# Patient Record
Sex: FEMALE | Race: WHITE | NOT HISPANIC OR LATINO | Employment: FULL TIME | URBAN - METROPOLITAN AREA
[De-identification: names, ages, dates, MRNs, and addresses within clinical notes are randomized per-mention and may not be internally consistent; named-entity substitution may affect disease eponyms.]

---

## 2017-02-27 ENCOUNTER — ALLSCRIPTS OFFICE VISIT (OUTPATIENT)
Dept: OTHER | Facility: OTHER | Age: 62
End: 2017-02-27

## 2017-03-07 ENCOUNTER — ALLSCRIPTS OFFICE VISIT (OUTPATIENT)
Dept: OTHER | Facility: OTHER | Age: 62
End: 2017-03-07

## 2017-04-06 DIAGNOSIS — Z47.1 AFTERCARE FOLLOWING JOINT REPLACEMENT SURGERY: ICD-10-CM

## 2017-04-06 DIAGNOSIS — M16.11 PRIMARY OSTEOARTHRITIS OF RIGHT HIP: ICD-10-CM

## 2017-04-07 ENCOUNTER — HOSPITAL ENCOUNTER (OUTPATIENT)
Dept: RADIOLOGY | Facility: MEDICAL CENTER | Age: 62
Discharge: HOME | End: 2017-04-07
Payer: COMMERCIAL

## 2017-04-07 ENCOUNTER — ALLSCRIPTS OFFICE VISIT (OUTPATIENT)
Dept: OTHER | Facility: OTHER | Age: 62
End: 2017-04-07

## 2017-04-07 DIAGNOSIS — Z47.1 AFTERCARE FOLLOWING JOINT REPLACEMENT SURGERY: ICD-10-CM

## 2017-04-07 DIAGNOSIS — M16.11 PRIMARY OSTEOARTHRITIS OF RIGHT HIP: ICD-10-CM

## 2017-04-07 PROCEDURE — 73502 X-RAY EXAM HIP UNI 2-3 VIEWS: CPT

## 2017-07-31 ENCOUNTER — ALLSCRIPTS OFFICE VISIT (OUTPATIENT)
Dept: OTHER | Facility: OTHER | Age: 62
End: 2017-07-31

## 2017-08-01 ENCOUNTER — HOSPITAL ENCOUNTER (EMERGENCY)
Facility: HOSPITAL | Age: 62
Discharge: HOME | End: 2017-08-01
Attending: EMERGENCY MEDICINE | Admitting: EMERGENCY MEDICINE
Payer: COMMERCIAL

## 2017-08-01 VITALS
OXYGEN SATURATION: 97 % | TEMPERATURE: 99 F | DIASTOLIC BLOOD PRESSURE: 76 MMHG | BODY MASS INDEX: 28.11 KG/M2 | HEART RATE: 72 BPM | WEIGHT: 174.16 LBS | SYSTOLIC BLOOD PRESSURE: 142 MMHG | RESPIRATION RATE: 16 BRPM

## 2017-08-01 DIAGNOSIS — L03.116 CELLULITIS OF LEFT LOWER EXTREMITY: Primary | ICD-10-CM

## 2017-08-01 PROCEDURE — 99282 EMERGENCY DEPT VISIT SF MDM: CPT

## 2017-08-01 RX ORDER — CEPHALEXIN 500 MG/1
500 CAPSULE ORAL 4 TIMES DAILY
Qty: 40 CAPSULE | Refills: 0 | Status: SHIPPED | OUTPATIENT
Start: 2017-08-01 | End: 2017-08-11

## 2017-08-01 RX ORDER — SULFAMETHOXAZOLE AND TRIMETHOPRIM 800; 160 MG/1; MG/1
1 TABLET ORAL EVERY 12 HOURS SCHEDULED
COMMUNITY
End: 2018-02-02

## 2017-08-04 ENCOUNTER — HOSPITAL ENCOUNTER (EMERGENCY)
Facility: HOSPITAL | Age: 62
Discharge: HOME | End: 2017-08-04
Attending: EMERGENCY MEDICINE | Admitting: EMERGENCY MEDICINE
Payer: COMMERCIAL

## 2017-08-04 VITALS
SYSTOLIC BLOOD PRESSURE: 167 MMHG | WEIGHT: 174 LBS | TEMPERATURE: 98.6 F | HEIGHT: 66 IN | OXYGEN SATURATION: 98 % | BODY MASS INDEX: 27.97 KG/M2 | HEART RATE: 96 BPM | RESPIRATION RATE: 20 BRPM | DIASTOLIC BLOOD PRESSURE: 85 MMHG

## 2017-08-04 DIAGNOSIS — L03.116 CELLULITIS OF LEFT LOWER EXTREMITY: Primary | ICD-10-CM

## 2017-08-04 PROCEDURE — 99283 EMERGENCY DEPT VISIT LOW MDM: CPT

## 2017-08-10 ENCOUNTER — ALLSCRIPTS OFFICE VISIT (OUTPATIENT)
Dept: OTHER | Facility: OTHER | Age: 62
End: 2017-08-10

## 2017-11-10 DIAGNOSIS — Z12.31 ENCOUNTER FOR SCREENING MAMMOGRAM FOR MALIGNANT NEOPLASM OF BREAST: ICD-10-CM

## 2018-01-11 ENCOUNTER — GENERIC CONVERSION - ENCOUNTER (OUTPATIENT)
Dept: OTHER | Facility: OTHER | Age: 63
End: 2018-01-11

## 2018-01-11 LAB
BASOPHILS # BLD AUTO: 0 X10E3/UL (ref 0–0.2)
BASOPHILS # BLD AUTO: 1 %
DEPRECATED RDW RBC AUTO: 13.7 % (ref 12.3–15.4)
EOSINOPHIL # BLD AUTO: 0.2 X10E3/UL (ref 0–0.4)
EOSINOPHIL # BLD AUTO: 4 %
HBA1C MFR BLD HPLC: 5.5 % (ref 4.8–5.6)
HCT VFR BLD AUTO: 41.8 % (ref 34–46.6)
HGB BLD-MCNC: 13.6 G/DL (ref 11.1–15.9)
IMM.GRANULOCYTES (CD4/8) (HISTORICAL): 0 %
IMM.GRANULOCYTES (CD4/8) (HISTORICAL): 0 X10E3/UL (ref 0–0.1)
LYMPHOCYTES # BLD AUTO: 2.5 X10E3/UL (ref 0.7–3.1)
LYMPHOCYTES # BLD AUTO: 44 %
MCH RBC QN AUTO: 29 PG (ref 26.6–33)
MCHC RBC AUTO-ENTMCNC: 32.5 G/DL (ref 31.5–35.7)
MCV RBC AUTO: 89 FL (ref 79–97)
MONOCYTES # BLD AUTO: 0.4 X10E3/UL (ref 0.1–0.9)
MONOCYTES (HISTORICAL): 7 %
NEUTROPHILS # BLD AUTO: 2.5 X10E3/UL (ref 1.4–7)
NEUTROPHILS # BLD AUTO: 44 %
PLATELET # BLD AUTO: 305 X10E3/UL (ref 150–379)
RBC (HISTORICAL): 4.69 X10E6/UL (ref 3.77–5.28)
WBC # BLD AUTO: 5.7 X10E3/UL (ref 3.4–10.8)

## 2018-01-11 NOTE — PROGRESS NOTES
Preliminary Nursing Report                Patient Information    Initial Encounter Entry Date:   2/10/2016 1:28 PM EST (Automated Transmission Automated Transmission)       Last Modified:   {Dayron Foley}              Legal Name: Grover Aldrich        Social Security Number:        YOB: 1955        Age (years): 63        Gender: F        Body Mass Index (BMI): 25 kg/m2        Height: 66 in  Weight: 157 lbs (71 kgs)           Address:   [address] [place] [address]              Phone: [phone]        Email:        Ethnicity: Decline to [facility]        Worship:        Marital Status:        Preferred Language: English        Race: Other Race                    Patient Insurance Information        Primary Insurance Information Carrier Name: {Primary  CarrierName}           Carrier Address:   {Primary  CarrierAddress}              Carrier Phone: {Primary  CarrierPhone}          Group Number: {Primary  GroupNumber}          Policy Number: {Primary  PolicyNumber}          Insured Name: {Primary  InsuredName}          Insured : {Primary  InsuredDOB}          Relationship to Insured: {Primary  RelationshiptoInsured}           Secondary Insurance Information Carrier Name: {Secondary  CarrierName}           Carrier Address:   {Secondary  CarrierAddress}              Carrier Phone: {Secondary  CarrierPhone}          Group Number: {Secondary  GroupNumber}          Policy Number: {Secondary  PolicyNumber}          Insured Name: {Secondary  InsuredName}          Insured : {Secondary  InsuredDOB}          Relationship to Insured: {Secondary  RelationshiptoInsured}                       Health Profile   Booking #:   Becky Kearney #: [id number]               DOS: 2016    Surgery : TOTAL HIP REPLACEMENT    Add'l Procedures/Notes:     Surgery Risk: Intermediate          Precautions          Allergies    No Known Drug Allergies             Medications    Bactroban Nasal 2 % Nasal Ointment BL Calcium 600 + D TABS       Ferrous Sulfate 325 (65 Fe) MG Oral Tablet       Fish Oil 1360 MG Oral Capsule       Folic Acid 1 MG Oral Tablet       Multivitamins Oral Capsule       TraMADol HCl - 50 MG Oral Tablet       Vitamin C 500 MG Oral Capsule               Conditions    Acute sinusitis       Encounter for screening mammogram for malignant neoplasm of breast       Hyperlipidemia       Microscopic hematuria       Primary localized osteoarthritis of right hip       Right hip pain       Scabies       Screening for disorder of blood and blood-forming organs       Skin lesion       Urinary tract infection       Visit for routine gyn exam               Family History    None             Surgical History    None             Social History    Marital History - Currently        Never A Smoker       Occupation:       Clinical Comments:  at [employer] for 30 years; Patient Instructions       ? NPO Instructions   The day before surgery it is recommended to have a light dinner at your usual time and you are allowed a light snack early in the evening  Do not eat anything heavy or eat a big meal after 7pm  Do not eat or drink anything after midnight prior to your surgery  If you are supposed to take any of your medications, do so with a sip of water  Failure to follow these instructions can lead to an increased risk of lung complications and may result in a delay or cancellation of your procedure  If you have any questions, contact your institution for further instructions  No candy, no gum, no mints, no chewing tobacco   Triggered by: Medical Procedure Risk         ? Opioids (Pain Medication) 62  Please continue the following medications, if needed, up to and including the day of surgery (with a sip of water)  Triggered by: TraMADol HCl - 50 MG Oral Tablet               Testing Considerations       ?  Complete Blood Count (CBC) t  If test was completed and normal within last six months, repeat test is not necessary  Triggered by: Acute sinusitis         ? Electrocardiogram (ECG) t  Patient does not need new test if normal ECG is present within the last six months and no change in clinical condition  Triggered by: Age or Facility Rec         ? Type and Screen client  Type and Screen - Blood: If there is anticipated or possible large blood loss with this procedure, then a Type and Screen for Blood should be ordered  Triggered by: Age or Facility Rec         ? Urinalysis t  Urinalysis may be appropriate if recent urinary symptoms or implants are being placed in surgical procedure  Triggered by: Urinary tract infection               Consultations       ? Pain Management Notes client, client, client  The patient has listed conditions or takes medication that may affect their pain management  Triggered by: TraMADol HCl - 50 MG Oral Tablet, Age or Facility Rec         ? Primary Care Physician Evaluation   Primary care physician may need to evaluate patient prior to surgery  This is likely NOT necessary if the listed conditions are chronic and stable  Triggered by: Microscopic hematuria               Miscellaneous Questions         Question: Are you able to walk up a flight of stairs, walk up a hill or do heavy housework WITHOUT having chest pain or shortness of breath? Answer: YES                   Allergies/Conditions/Medications Not Found        The following were not recognized by our system when generating the recommendations  Please consider if this would impact any preoperative protocols  ? Encounter for screening mammogram for malignant neoplasm of breast       ? Marital History - Currently        ? Never A Smoker       ? Occupation:       ? Bactroban Nasal 2 % Nasal Ointment       ? BL Calcium 600 + D TABS       ? Ferrous Sulfate 325 (65 Fe) MG Oral Tablet       ?  Fish Oil 1360 MG Oral Capsule                  Appointment Information Date:    04/18/2016        Location:    [facility]        Address:           Directions:                      Footnotes revision 14      ?? Denotes a free-text entry  Legal Disclaimer: Any and all recommendations and services provided herein are designed to assist in the preoperative care of the patient  Nothing contained herein is designed to replace, eliminate or alleviate the responsibility of the attending physician to supervise and determine the patient?s preoperative care and course of treatment  Failure to provide complete, accurate information may negatively impact the system?s ability to recommend the proper preoperative protocol  THE ATTENDING PHYSICIAN IS RESPONSIBLE TO REVIEW THE SUGGESTED PREOPERATIVE PROTOCOLS/COURSE OF TREATMENT AND PRESCRIBE THE FINAL COURSE OF PREOPERATIVE TREATMENT IN CONSULTATION WITH THE PATIENT  THE ePREOP SYSTEM AND ITS MATERIALS ARE PROVIDED ? AS IS? WITHOUT WARRANTY OF ANY KIND, EXPRESS OR IMPLIED, INCLUDING, BUT NOT LIMITED TO, WARRANTIES OF PERFORMANCE OR MERCHANTABILITY OR FITNESS FOR A PARTICULAR PURPOSE  PATIENT AND PHYSICIANS HEREBY AGREE THAT THEIR USE OF THE MATERIALS AND RESOURCES ACT AS A CONSENT TO RELEASE AND WAIVE ePREOP FROM ANY AND ALL CLAIMS OF WARRANTY, TORT OR CONTRACT LAW OF ANY KIND  Electronically signed by:Valdo GARRIDO    Feb 12 2016  8:37AM EST

## 2018-01-12 VITALS
WEIGHT: 167.38 LBS | SYSTOLIC BLOOD PRESSURE: 158 MMHG | HEART RATE: 70 BPM | TEMPERATURE: 98 F | RESPIRATION RATE: 16 BRPM | DIASTOLIC BLOOD PRESSURE: 70 MMHG | BODY MASS INDEX: 26.9 KG/M2 | HEIGHT: 66 IN

## 2018-01-12 VITALS
DIASTOLIC BLOOD PRESSURE: 78 MMHG | HEART RATE: 80 BPM | WEIGHT: 172.13 LBS | RESPIRATION RATE: 16 BRPM | SYSTOLIC BLOOD PRESSURE: 126 MMHG | TEMPERATURE: 98.1 F | BODY MASS INDEX: 27.78 KG/M2

## 2018-01-12 LAB
25(OH)D3 SERPL-MCNC: 19.8 NG/ML (ref 30–100)
A/G RATIO (HISTORICAL): 1.8 (ref 1.2–2.2)
ALBUMIN SERPL BCP-MCNC: 4.3 G/DL (ref 3.6–4.8)
ALP SERPL-CCNC: 89 IU/L (ref 39–117)
ALT SERPL W P-5'-P-CCNC: 22 IU/L (ref 0–32)
AST SERPL W P-5'-P-CCNC: 16 IU/L (ref 0–40)
BILIRUB SERPL-MCNC: <0.2 MG/DL (ref 0–1.2)
BUN SERPL-MCNC: 19 MG/DL (ref 8–27)
BUN/CREA RATIO (HISTORICAL): 22 (ref 12–28)
CALCIUM SERPL-MCNC: 9 MG/DL (ref 8.7–10.3)
CHLORIDE SERPL-SCNC: 102 MMOL/L (ref 96–106)
CHOLEST SERPL-MCNC: 236 MG/DL (ref 100–199)
CO2 SERPL-SCNC: 26 MMOL/L (ref 18–29)
CREAT SERPL-MCNC: 0.88 MG/DL (ref 0.57–1)
EGFR AFRICAN AMERICAN (HISTORICAL): 81 ML/MIN/1.73
EGFR-AMERICAN CALC (HISTORICAL): 70 ML/MIN/1.73
GLUCOSE SERPL-MCNC: 91 MG/DL (ref 65–99)
HDLC SERPL-MCNC: 44 MG/DL
LDLC SERPL CALC-MCNC: 169 MG/DL (ref 0–99)
POTASSIUM SERPL-SCNC: 4.2 MMOL/L (ref 3.5–5.2)
SODIUM SERPL-SCNC: 143 MMOL/L (ref 134–144)
TOT. GLOBULIN, SERUM (HISTORICAL): 2.4 G/DL (ref 1.5–4.5)
TOTAL PROTEIN (HISTORICAL): 6.7 G/DL (ref 6–8.5)
TRIGL SERPL-MCNC: 115 MG/DL (ref 0–149)
TSH SERPL DL<=0.05 MIU/L-ACNC: 2.27 UIU/ML (ref 0.45–4.5)
VLDLC SERPL CALC-MCNC: 23 MG/DL (ref 5–40)

## 2018-01-12 NOTE — PROGRESS NOTES
Assessment   1  Hyperlipidemia (272 4) (E78 5)  2  Encounter for preventive health examination (V70 0) (Z00 00)  3  Visit for routine gyn exam (V72 31) (Z01 419)    Plan  Breast cancer screening    · * MAMMO SCREENING BILATERAL W CAD; Status:Active - Retrospective By Protocol  Authorization; Requested for:10Oct2016;   Colon cancer screening    · COLONOSCOPY; Status:Active - Retrospective By Protocol Authorization; Requested  for:10Oct2016;    · 1 - Joe MELGAR, Gayle Pinto  (Gastroenterology) Physician Referral  Consult  Status: Active - Retrospective By Protocol Authorization  Requested for:1  [id number]  () Care Summary provided  : Yes  Health Maintenance    · *VB-Depression Screening; Status:Resulted - Requires Verification;   Done: 09INM3298  12:00AM1   Hyperlipidemia    · (1) CBC/PLT/DIFF; Status:Active - Retrospective By Protocol Authorization; Requested  for:10Oct2016;    · (1) COMPREHENSIVE METABOLIC PANEL; Status:Active - Retrospective By Protocol  Authorization; Requested for:10Oct2016;    · (1) LIPID PANEL, FASTING; Status:Active - Retrospective By Protocol Authorization; Requested for:10Oct2016;    · (1) TSH; Status:Active - Retrospective By Protocol Authorization; Requested  for:10Oct2016;   Need for shingles vaccine    · Start: Zostavax 44816 UNT/0 65ML Subcutaneous Solution Reconstituted (Zostavax  27953 UNT/0 65ML Subcutaneous Solution Reconstituted); INJECT 0 65  ML  Subcutaneous  Pap smear for cervical cancer screening    · (Q) THINPREP TIS PAP AND HR HPV DNA; Status:Active - Retrospective By Protocol  Authorization; Requested for:10Oct2016; 1   PREV PAP: : 2014  LMP: : 9/1/20011     Check pap smear, mammogram, BW  Zostavax  Colonoscopy referral       1 Amended By: Esdras Alberto; Oct 10 2016 11:59 PM EST    Discussion/Summary    1  HMN-needs colonoscopy  Immunizations up dated  Gets flu shots at work  Dexa scan next year  2  Gyn exam  3  S/P Right THR-doing well        History of Present Illness  HM, Adult Female: The patient is being seen for a gynecology evaluation  General Health: She has regular dental visits  She complains of vision problems  Vision care includes an eye examination within the last year and Dr Antione Kee  She denies hearing loss  Lifestyle:  She consumes a diverse and healthy diet  (She says she eats fruits and vegetables, whole grains  Avoids red meats  Caffeine 2 coffee and 1 coke daily)   She has weight concerns  (Gained about 5 lbs since her hip surgery  )   She exercises regularly  (She has resumed walking but admits she needs to exercise more  )   She does not use tobacco  She consumes alcohol  She reports occasional alcohol use  Reproductive health: the patient is postmenopausal    Screening:      Review of Systems    Genitourinary: no dysuria, no pelvic pain, no vaginal discharge, no incontinence and no unexplained vaginal bleeding  Active Problems   1  Acute sinusitis (461 9) (J01 90)  2  Aftercare following right hip joint replacement surgery (V54 81,V43 64) (Z47 1,Z96 641)  3  Encounter for screening mammogram for malignant neoplasm of breast (V76 12)   (Z12 31)  4  Hyperlipidemia (272 4) (E78 5)  5  Microscopic hematuria (599 72) (R31 29)  6  Primary localized osteoarthritis of right hip (715 15) (M16 11)  7  Right hip pain (719 45) (M25 551)  8  Scabies (133 0) (B86)  9  Screening for disorder of blood and blood-forming organs (V78 9) (Z13 0)  10  Skin lesion (709 9) (L98 9)  11  Urinary tract infection (599 0) (N39 0)  12  Visit for routine gyn exam (V72 31) (Z01 419)    Social History    · Marital History - Currently    · Never A Smoker   · Occupation:   ·  at [employer] for 30 years    Current Meds  1   No Reported Medications Recorded    Allergies   1  tramadol    Vitals   Recorded: 22KOF0629 54:90IR   Systolic 916   Diastolic 76   Heart Rate 76   Respiration 18   LMP 01-Sep-2001   Weight 162 lb 8 0 oz Physical Exam    Constitutional   General appearance: No acute distress, well appearing and well nourished  Neck   Neck: Supple, symmetric, trachea midline, no masses  Thyroid: Normal, no thyromegaly  Pulmonary   Respiratory effort: No increased work of breathing or signs of respiratory distress  Auscultation of lungs: Clear to auscultation  Cardiovascular   Auscultation of heart: Normal rate and rhythm, normal S1 and S2, no murmurs  Carotid pulses: 2+ bilaterally  Abdominal aorta: Normal     Femoral pulses: 2+ bilaterally  Pedal pulses: 2+ bilaterally  Peripheral vascular exam: Normal     Examination of extremities for edema and/or varicosities: Normal     Chest   Breasts: Normal, no dimpling or skin changes appreciated  Palpation of breasts and axillae: Normal, no masses palpated  Abdomen   Abdomen: Non-tender, no masses  Liver and spleen: No hepatomegaly or splenomegaly  Anus, perineum, and rectum: Normal sphincter tone, no masses, no prolapse  Stool sample for occult blood: Negative  Genitourinary   External genitalia and vagina: Normal, no lesions appreciated  Urethra: Normal, no discharge  Cervix: Normal, no lesions  Uterus: Normal size, no tenderness, no masses  Adnexa/Parametria: Normal, no masses or tenderness  Lymphatic   Palpation of lymph nodes in neck: No lymphadenopathy  Palpation of lymph nodes in axillae: No lymphadenopathy  Palpation of lymph nodes in groin: No lymphadenopathy  Musculoskeletal   Gait and station: Normal     Psychiatric   Mood and affect: Normal        Future Appointments    Date/Time Provider Specialty Site   10/11/2017 01:00 PM NADINE Leyva  [address] [facility]   04/07/2017 09:45 AM NADINE Orellana   [address] [facility] [place]     Signatures   Electronically signed by : NADINE Abdul ; Oct 11 2016 12:01AM EST                       (Author)

## 2018-01-12 NOTE — MISCELLANEOUS
History of Present Illness  TCM Communication [facility]: The patient is being contacted for follow-up after hospitalization  Diagnosis: hip arthoplasty  She was discharged to home  Follow-up appointments with other specialists: Saw CHERYLE Mac and another therapist today  Symptoms: swelling location leg affected  Referrals Needed:  Will be following with specialist  Will discuss the swelling today with the therapist who visits  Counseling was provided to the patient  Communication performed and completed by JOJO THOMPSON      Active Problems    1  Acute sinusitis (461 9) (J01 90)   2  Encounter for screening mammogram for malignant neoplasm of breast (V76 12)   (Z12 31)   3  Hyperlipidemia (272 4) (E78 5)   4  Microscopic hematuria (599 72) (R31 2)   5  Primary localized osteoarthritis of right hip (715 15) (M16 11)   6  Right hip pain (719 45) (M25 551)   7  Scabies (133 0) (B86)   8  Screening for disorder of blood and blood-forming organs (V78 9) (Z13 0)   9  Skin lesion (709 9) (L98 9)   10  Urinary tract infection (599 0) (N39 0)   11  Visit for routine gyn exam (V72 31) (Z01 419)    Social History    · Marital History - Currently    · Never A Smoker   · Occupation:    Current Meds   1  Bactroban Nasal 2 % Nasal Ointment; apply small amount in each nostril  1 x per day for   5 days prior to surgery; Therapy: 98KBJ9065 to (Last Rx:93Sts5865) Ordered   2  BL Calcium 600 + D TABS; Take 1 tablet daily Recorded   3  Ferrous Sulfate 325 (65 Fe) MG Oral Tablet; TAKE 1 TABLET 3 TIMES DAILY WITH FOOD; Therapy: 57AJX7050 to (Evaluate:04Klo5740); Last Rx:13Xho8403 Ordered   4  Fish Oil 1360 MG Oral Capsule; Therapy: 36NRH2465 to Recorded   5  Folic Acid 1 MG Oral Tablet; TAKE 1 TABLET DAILY AS DIRECTED; Therapy: 06JLZ1501 to (Evaluate:06Mar2016); Last Rx:13Ztz2430 Ordered   6  Multivitamins Oral Capsule; TAKE 1 CAPSULE Daily Recorded   7   TraMADol HCl - 50 MG Oral Tablet; TAKE 1 TO 2 TABLETS 4 TIMES DAILY AS NEEDED   FOR PAIN;   Therapy: 79EDW9427 to (Evaluate:04Jun2016); Last Rx:14Oog7656 Ordered   8  Vitamin C 500 MG Oral Capsule; TAKE 1 CAPSULE 3 times daily; Therapy: 78MDZ1144 to (Evaluate:05Apr2016); Last Rx:44Kos0770 Ordered    Allergies    1  tramadol    Future Appointments    Date/Time Provider Specialty Site   10/10/2016 01:00 PM NADINE Vega  [address] [facility] [place]   04/29/2016 10:30 AM NADINE Fajardo  Orthopedic Surgery [facility] SURGICAL \[facility]\""   05/06/2016 10:30 AM NADINE Fajardo   Orthopedic [address] [place] [address]     Signatures   Electronically signed by : Nidia Oppenheim, M D ; Apr 25 2016 10:29PM EST

## 2018-01-13 VITALS
HEART RATE: 80 BPM | BODY MASS INDEX: 26.83 KG/M2 | SYSTOLIC BLOOD PRESSURE: 136 MMHG | DIASTOLIC BLOOD PRESSURE: 70 MMHG | RESPIRATION RATE: 16 BRPM | TEMPERATURE: 98.6 F | WEIGHT: 166.25 LBS

## 2018-01-14 VITALS
HEART RATE: 79 BPM | HEIGHT: 66 IN | WEIGHT: 167.38 LBS | DIASTOLIC BLOOD PRESSURE: 79 MMHG | SYSTOLIC BLOOD PRESSURE: 143 MMHG | BODY MASS INDEX: 26.9 KG/M2

## 2018-01-15 VITALS
DIASTOLIC BLOOD PRESSURE: 80 MMHG | RESPIRATION RATE: 18 BRPM | HEART RATE: 80 BPM | WEIGHT: 172.13 LBS | SYSTOLIC BLOOD PRESSURE: 126 MMHG | BODY MASS INDEX: 27.78 KG/M2

## 2018-01-16 ENCOUNTER — ALLSCRIPTS OFFICE VISIT (OUTPATIENT)
Dept: OTHER | Facility: OTHER | Age: 63
End: 2018-01-16

## 2018-01-16 ENCOUNTER — GENERIC CONVERSION - ENCOUNTER (OUTPATIENT)
Dept: OTHER | Facility: OTHER | Age: 63
End: 2018-01-16

## 2018-01-16 DIAGNOSIS — Z13.820 ENCOUNTER FOR SCREENING FOR OSTEOPOROSIS: ICD-10-CM

## 2018-01-16 DIAGNOSIS — Z12.31 ENCOUNTER FOR SCREENING MAMMOGRAM FOR MALIGNANT NEOPLASM OF BREAST: ICD-10-CM

## 2018-01-23 NOTE — PROGRESS NOTES
Assessment    1  Encounter for screening mammogram for malignant neoplasm of breast (V76 12)   (Z12 31)   2  Encounter for preventive health examination (V70 0) (Z00 00)   3  Hyperlipidemia (272 4) (E78 5)   4  Overweight (278 02) (E66 3)   5  Visit for routine gyn exam (V72 31) (Z01 419)    Plan  Colon cancer screening    · 1 - Mat Chamberlain MD, Brenda Booker (Gastroenterology) Co-Management  *  Status: Active -  Retrospective By Protocol Authorization  Requested for: [id number]  Care Summary provided  : Yes   · COLONOSCOPY; Status:Active - Retrospective By Protocol Authorization; Requested  VYL:[id number];   Encounter for screening mammogram for malignant neoplasm of breast    · * MAMMO SCREENING BILATERAL W CAD; Status:Hold For - Scheduling; Requested  for:13Ulb7676;   Hyperlipidemia    · (1) LIPID PANEL, FASTING; Status:Active - Retrospective By Protocol Authorization; Requested OMV:[id number];   Need for shingles vaccine    · Zostavax 15146 UNT/0 65ML Subcutaneous Solution Reconstituted (Zostavax  81390 UNT/0 65ML Subcutaneous Solution Reconstituted); INJECT 0 65  ML  Subcutaneous  Osteoporosis screening    · * DXA BONE DENSITY SPINE HIP AND PELVIS; Status:Active - Retrospective By  Protocol Authorization; Requested KDN:[id number]; Overweight, Visit for routine gyn exam    · Begin or continue regular aerobic exercise  Gradually work up to at least {count1}  sessions of {dur1} of exercise a week ; Status:Complete;   Done: 60XTD4703   · Eat a low fat and low cholesterol diet ; Status:Complete;   Done: 27ZLB1683   · We have prescribed Kegel exercises to be done in a set of 15 repetitions, 3 times a day ;  Status:Complete;   Done: 44HLK2445   · We recommend that you bring your body mass index down to 26 ; Status:Complete;    Done: 02UFW5953    Check Pap smear, HPV  Dexa scan  Colonoscopy referral  Zostavax  Discussion/Summary    1  HM-updated immunizations  Advised Zostavax, Tdap  Due for Dexa scan, colonoscopy      2  Vit D deficiency-advised Vit D supplement  3  Hyperlipidemia-discussed low fat diet  Will reassess in 6 months  Has FH and low HDL; may need statin  4  Gyn exam       History of Present Illness  HM, Adult Female: The patient is being seen for a health maintenance and gynecology evaluation  General Health: The patient's health since the last visit is described as good  She has regular dental visits  She complains of vision problems  Vision care includes wearing glasses, an eye examination within the last year and Dr Jaime Triana  Lifestyle:  She consumes a diverse and healthy diet  She has weight concerns  Miguelina Phillips admits she gained the weight back she lost  She admits she eats too much junk  Eats fruits and vegetables and whole grains  Dietary calcium 1-2 daily  Caffeine 3 daily)   She exercises regularly  (She is still walking regularly)   She consumes alcohol  She reports occasional alcohol use  Screening:   HPI: Araceli Aguilar said she is doing well overall  She gets occ heartburn with drinking wine and certain foods  Tums helps  Review of Systems    Genitourinary: Gets occ  incontinence with cough, but no dysuria, no pelvic pain, no vaginal discharge, no incontinence and no unexplained vaginal bleeding  Active Problems    1  Cellulitis of ankle (682 6) (L03 119)   2  Colon cancer screening (V76 51) (Z12 11)   3  Diabetes mellitus screening (V77 1) (Z13 1)   4  Encounter for screening mammogram for malignant neoplasm of breast (V76 12)   (Z12 31)   5  Encounter for vitamin deficiency screening (V77 99) (Z13 21)   6  Hyperlipidemia (272 4) (E78 5)   7  Need for shingles vaccine (V04 89) (Z23)   8   Pap smear for cervical cancer screening (V76 2) (Z12 4)    Past Medical History    · Patient denies significant medical history    Surgical History    · History of Hip Replacement Right   · History of Hysterectomy   · History of Shoulder Surgery Right   · History of Tonsillectomy With Adenoidectomy    Family History  Mother    · Family history of Alzheimer's disease (V17 2) (Z82 0)   · Family history of hypertension (V17 49) (Z82 49)  Father    · Family history of pulmonary fibrosis (V17 6) (Z83 6)    The family history was reviewed and updated today  Social History    · Marital History - Currently    · Never A Smoker   · Occupation:   ·  at [employer] for 30 years    Allergies    1  tramadol    Vitals   Recorded: 09TBY8090 08:31AM   Heart Rate 90   Respiration 16   Systolic 441 mm Hg   Diastolic 78 mm Hg   Height 5 ft 5 5 in   Weight 173 lb 6 oz   BMI Calculated 28 41 kg/m2   BSA Calculated 1 87 m2   LMP 81Nxf5453     Physical Exam    Constitutional   General appearance: No acute distress, well appearing and well nourished  Neck   Neck: Supple, symmetric, trachea midline, no masses  Pulmonary   Respiratory effort: No increased work of breathing or signs of respiratory distress  Auscultation of lungs: Clear to auscultation  Cardiovascular   Auscultation of heart: Normal rate and rhythm, normal S1 and S2, no murmurs  Carotid pulses: 2+ bilaterally  Abdominal aorta: Normal     Femoral pulses: 2+ bilaterally  Pedal pulses: 2+ bilaterally  Peripheral vascular exam: Normal     Examination of extremities for edema and/or varicosities: Normal     Chest   Breasts: Normal, no dimpling or skin changes appreciated  Palpation of breasts and axillae: Normal, no masses palpated  Abdomen   Abdomen: Non-tender, no masses  Liver and spleen: No hepatomegaly or splenomegaly  Genitourinary   External genitalia and vagina: Normal, no lesions appreciated  Urethra: Normal, no discharge  Cervix: Normal, no lesions  Uterus: Normal size, no tenderness, no masses  Adnexa/Parametria: Normal, no masses or tenderness  Lymphatic   Palpation of lymph nodes in neck: No lymphadenopathy  Palpation of lymph nodes in axillae: No lymphadenopathy  Palpation of lymph nodes in groin: No lymphadenopathy         Signatures   Electronically signed by : NADINE Mcdaniel ; Jan 16 2018  9:31AM EST                       (Author)

## 2018-01-24 VITALS
WEIGHT: 173.38 LBS | RESPIRATION RATE: 16 BRPM | DIASTOLIC BLOOD PRESSURE: 78 MMHG | HEIGHT: 66 IN | BODY MASS INDEX: 27.86 KG/M2 | HEART RATE: 90 BPM | SYSTOLIC BLOOD PRESSURE: 138 MMHG

## 2018-02-03 ENCOUNTER — ANESTHESIA EVENT (OUTPATIENT)
Dept: PERIOP | Facility: HOSPITAL | Age: 63
End: 2018-02-03
Payer: COMMERCIAL

## 2018-02-03 ENCOUNTER — ANESTHESIA (OUTPATIENT)
Dept: PERIOP | Facility: HOSPITAL | Age: 63
End: 2018-02-03
Payer: COMMERCIAL

## 2018-02-03 ENCOUNTER — HOSPITAL ENCOUNTER (OUTPATIENT)
Facility: HOSPITAL | Age: 63
Setting detail: OUTPATIENT SURGERY
Discharge: HOME | End: 2018-02-03
Attending: INTERNAL MEDICINE | Admitting: INTERNAL MEDICINE
Payer: COMMERCIAL

## 2018-02-03 VITALS
WEIGHT: 169.09 LBS | SYSTOLIC BLOOD PRESSURE: 159 MMHG | TEMPERATURE: 98.2 F | BODY MASS INDEX: 27.18 KG/M2 | HEIGHT: 66 IN | RESPIRATION RATE: 18 BRPM | DIASTOLIC BLOOD PRESSURE: 80 MMHG | OXYGEN SATURATION: 100 % | HEART RATE: 69 BPM

## 2018-02-03 DIAGNOSIS — Z12.11 ENCOUNTER FOR SCREENING FOR MALIGNANT NEOPLASM OF COLON: ICD-10-CM

## 2018-02-03 PROCEDURE — 88305 TISSUE EXAM BY PATHOLOGIST: CPT | Performed by: INTERNAL MEDICINE

## 2018-02-03 PROCEDURE — 88305 TISSUE EXAM BY PATHOLOGIST: CPT | Performed by: PATHOLOGY

## 2018-02-03 PROCEDURE — 45385 COLONOSCOPY W/LESION REMOVAL: CPT | Performed by: INTERNAL MEDICINE

## 2018-02-03 PROCEDURE — 45380 COLONOSCOPY AND BIOPSY: CPT | Performed by: INTERNAL MEDICINE

## 2018-02-03 RX ORDER — SODIUM CHLORIDE, SODIUM LACTATE, POTASSIUM CHLORIDE, CALCIUM CHLORIDE 600; 310; 30; 20 MG/100ML; MG/100ML; MG/100ML; MG/100ML
50 INJECTION, SOLUTION INTRAVENOUS CONTINUOUS
Status: DISCONTINUED | OUTPATIENT
Start: 2018-02-03 | End: 2018-02-03 | Stop reason: HOSPADM

## 2018-02-03 RX ORDER — LIDOCAINE HYDROCHLORIDE 10 MG/ML
INJECTION, SOLUTION EPIDURAL; INFILTRATION; INTRACAUDAL; PERINEURAL AS NEEDED
Status: DISCONTINUED | OUTPATIENT
Start: 2018-02-03 | End: 2018-02-03 | Stop reason: SURG

## 2018-02-03 RX ORDER — PROPOFOL 10 MG/ML
INJECTION, EMULSION INTRAVENOUS AS NEEDED
Status: DISCONTINUED | OUTPATIENT
Start: 2018-02-03 | End: 2018-02-03 | Stop reason: SURG

## 2018-02-03 RX ADMIN — PROPOFOL 100 MG: 10 INJECTION, EMULSION INTRAVENOUS at 09:39

## 2018-02-03 RX ADMIN — PROPOFOL 50 MG: 10 INJECTION, EMULSION INTRAVENOUS at 09:55

## 2018-02-03 RX ADMIN — PROPOFOL 50 MG: 10 INJECTION, EMULSION INTRAVENOUS at 09:45

## 2018-02-03 RX ADMIN — PROPOFOL 50 MG: 10 INJECTION, EMULSION INTRAVENOUS at 09:50

## 2018-02-03 RX ADMIN — LIDOCAINE HYDROCHLORIDE 20 MG: 10 INJECTION, SOLUTION EPIDURAL; INFILTRATION; INTRACAUDAL; PERINEURAL at 09:39

## 2018-02-03 RX ADMIN — SODIUM CHLORIDE, SODIUM LACTATE, POTASSIUM CHLORIDE, AND CALCIUM CHLORIDE 50 ML/HR: .6; .31; .03; .02 INJECTION, SOLUTION INTRAVENOUS at 09:25

## 2018-02-03 NOTE — DISCHARGE INSTRUCTIONS
Colonoscopy   WHAT YOU NEED TO KNOW:   A colonoscopy is a procedure to examine the inside of your colon (intestine) with a scope  Polyps or tissue growths may have been removed during your colonoscopy  It is normal to feel bloated and to have some abdominal discomfort  You should be passing gas  If you have hemorrhoids or you had polyps removed, you may have a small amount of bleeding  DISCHARGE INSTRUCTIONS:   Seek care immediately if:   · You have a large amount of bright red blood in your bowel movements  · Your abdomen is hard and firm and you have severe pain  · You have sudden trouble breathing  Contact your healthcare provider if:   · You develop a rash or hives  · You have a fever within 24 hours of your procedure  · You have not had a bowel movement for 3 days after your procedure  · You have questions or concerns about your condition or care  Activity:   · Do not lift, strain, or run  for 3 days after your procedure  · Rest after your procedure  You have been given medicine to relax you  Do not  drive or make important decisions until the day after your procedure  Return to your normal activity as directed  · Relieve gas and discomfort from bloating  by lying on your right side with a heating pad on your abdomen  You may need to take short walks to help the gas move out  Eat small meals until bloating is relieved  If you had polyps removed: For 7 days after your procedure:  · Do not  take aspirin  · Do not  go on long car rides  Help prevent constipation:   · Eat a variety of healthy foods  Healthy foods include fruit, vegetables, whole-grain breads, low-fat dairy products, beans, lean meat, and fish  Ask if you need to be on a special diet  Your healthcare provider may recommend that you eat high-fiber foods such as cooked beans  Fiber helps you have regular bowel movements  · Drink liquids as directed    Adults should drink between 9 and 13 eight-ounce cups of liquid every day  Ask what amount is best for you  For most people, good liquids to drink are water, juice, and milk  · Exercise as directed  Talk to your healthcare provider about the best exercise plan for you  Exercise can help prevent constipation, decrease your blood pressure and improve your health  Follow up with your healthcare provider as directed:  Write down your questions so you remember to ask them during your visits  © 2017 [facility] Information is for End User's use only and may not be sold, redistributed or otherwise used for commercial purposes  All illustrations and images included in CareNotes® are the copyrighted property of A D A M , Inc  or Dorian Juan  The above information is an  only  It is not intended as medical advice for individual conditions or treatments  Talk to your doctor, nurse or pharmacist before following any medical regimen to see if it is safe and effective for you

## 2018-02-03 NOTE — OP NOTE
**** GI/ENDOSCOPY REPORT ****     PATIENT NAME: María Thomas ------ VISIT ID:  Patient ID: [id number]   YOB: 1955     INTRODUCTION: Colonoscopy - A 60 female patient presents for an outpatient   Colonoscopy at [facility]  PREVIOUS COLONOSCOPY: No prior colonoscopy  INDICATIONS: Surveillance  Average-risk screening  CONSENT:  The benefits, risks, and alternatives to the procedure were   discussed and informed consent was obtained from the patient  PREPARATION: EKG, pulse, pulse oximetry and blood pressure were monitored   throughout the procedure  The patient was identified by myself both   verbally and by visual inspection of ID band  Airway Assessment   Classification: Airway class 2 - Visualization of the soft palate, fauces   and uvula  ASA Classification: Class 2 - Patient has mild to moderate   systemic disturbance that may or may not be related to the disorder   requiring surgery  The patient was kept NPO for eight hours prior to the   procedure  The patient received Nulytely in preparation for the procedure  MEDICATIONS: Anesthesia-check records MAC anesthesia  PROCEDURE:  The endoscope was passed without difficulty through the anus   under direct visualization and advanced to the cecum, confirmed by   appendiceal orifice and ileocecal valve  The scope was withdrawn and the   mucosa was carefully examined  The quality of the preparation was good  Cecal Intubation Time: 5 minutes(s) Scope Withdrawal Time: 17 minutes(s)     RECTAL EXAM: Normal rectal exam      FINDINGS:  There was evidence of diverticulosis in the sigmoid colon and   descending colon  A single polypoid pedunculated polyp, measuring 3 cm in   size, was found in the sigmoid colon  The polyp was completely removed by   snare cautery polypectomy  Retrieved  A single sessile polyp, measuring 3   cm in size, was found in the hepatic flexure   The polyp was completely   removed by cold snare polypectomy  The polyp was retrieved  A single flat   polyp, measuring between 10 and 20 mm in size, was found in the descending   colon  The polyp was completely removed by cold snare polypectomy  The   polyp was retrieved  Two sessile polyps, measuring between 5 and 10 mm in   size, were found in the transverse colon  All polyps were completely   removed by cold biopsy polypectomy  The polyps were retrieved  On   retroflexed view, internal hemorrhoids were found  COMPLICATIONS: There were no complications  IMPRESSIONS: Diverticulosis found in the sigmoid colon and descending   colon  A single polypoid pedunculated polyp found in the sigmoid colon;   removed by snare cautery polypectomy  A single sessile polyp found in the   hepatic flexure; removed by cold snare polypectomy  A single flat polyp   found in the descending colon; removed by cold snare polypectomy  Two   sessile polyps found in the transverse colon; all polyps removed by cold   biopsy polypectomy  Internal hemorrhoids  RECOMMENDATIONS: Colonoscopy recommended in 1 year  Follow-up on the   results of the biopsy specimens  ESTIMATED BLOOD LOSS: None  PATHOLOGY SPECIMENS: Completely removed by snare cautery polypectomy  Completely removed by cold snare polypectomy  Completely removed by cold   snare polypectomy  All polyps completely removed by cold biopsy   polypectomy   Yes     PROCEDURE CODES: Colonoscopy with biopsy : Colonoscopy with snare   polypectomy     ICD-9 Codes: 562 10 Diverticulosis of colon (without mention of   hemorrhage) 211 3 Benign neoplasm of colon 211 3 Benign neoplasm of colon   211 3 Benign neoplasm of colon 211 3 Benign neoplasm of colon     ICD-10 Codes: Z12 11 Encounter for screening for malignant neoplasm of   colon K57 Diverticular disease of intestine K63 5 Polyp of colon K63 5   Polyp of colon K63 5 Polyp of colon K63 5 Polyp of colon K64 9 Unspecified   hemorrhoids     PERFORMED BY: Dr Branden Sin NADINE Okeefe  on 02/03/2018  Version 1, electronically signed by NADINE Page  on   02/03/2018 at 10:05

## 2018-02-03 NOTE — ANESTHESIA POSTPROCEDURE EVALUATION
Post-Op Assessment Note      CV Status:  Stable    Mental Status:  Lethargic    Hydration Status:  Stable    PONV Controlled:  None    Airway Patency:  Patent    Post Op Vitals Reviewed: Yes          Staff: AnesthesiologistSOILA           BP (!) 177/88 (02/03/18 1002)    Temp      Pulse 80 (02/03/18 1002)   Resp 20 (02/03/18 1002)    SpO2 98 % (02/03/18 1002)

## 2018-02-03 NOTE — ANESTHESIA PREPROCEDURE EVALUATION
Review of Systems/Medical History  Patient summary reviewed  Chart reviewed  No history of anesthetic complications     Cardiovascular  Exercise tolerance: good,  Hyperlipidemia,    Pulmonary       GI/Hepatic            Endo/Other     GYN       Hematology   Musculoskeletal    Arthritis     Neurology   Psychology           Physical Exam    Airway    Mallampati score: II  TM Distance: >3 FB  Neck ROM: full     Dental   No notable dental hx     Cardiovascular      Pulmonary      Other Findings        Anesthesia Plan  ASA Score- 2     Anesthesia Type- IV sedation with anesthesia with ASA Monitors  Additional Monitors:   Airway Plan:         Plan Factors-    Induction- intravenous  Postoperative Plan-     Informed Consent- Anesthetic plan and risks discussed with patient  I personally reviewed this patient with the CRNA  Discussed and agreed on the Anesthesia Plan with the CRNA  Glendy Bravo

## 2018-03-30 ENCOUNTER — TELEPHONE (OUTPATIENT)
Dept: OBGYN CLINIC | Facility: HOSPITAL | Age: 63
End: 2018-03-30

## 2018-03-30 NOTE — TELEPHONE ENCOUNTER
Caller: patient  Call back number: [phone]  Patient's doctor: Dr Tushar Caldwell    Patient is asking for premedication for a dentist appointment  Per patient she has no allergies  She uses [facility] in [facility]

## 2018-03-30 NOTE — TELEPHONE ENCOUNTER
Please call in a prescription for this patient  It should read:  Cephalexin 500 mg  Four tabs 1 hour before dental visit  Number 40, no refills    Please call patient and inform of above

## 2018-06-16 DIAGNOSIS — E78.5 HYPERLIPIDEMIA: ICD-10-CM

## 2018-12-01 DIAGNOSIS — E78.5 HYPERLIPIDEMIA: ICD-10-CM

## 2018-12-01 DIAGNOSIS — Z13.1 ENCOUNTER FOR SCREENING FOR DIABETES MELLITUS: ICD-10-CM

## 2018-12-01 DIAGNOSIS — Z13.21 ENCOUNTER FOR SCREENING FOR NUTRITIONAL DISORDER: ICD-10-CM

## 2018-12-16 ENCOUNTER — APPOINTMENT (OUTPATIENT)
Dept: RADIOLOGY | Facility: CLINIC | Age: 63
End: 2018-12-16
Payer: COMMERCIAL

## 2018-12-16 ENCOUNTER — OFFICE VISIT (OUTPATIENT)
Dept: URGENT CARE | Facility: CLINIC | Age: 63
End: 2018-12-16
Payer: COMMERCIAL

## 2018-12-16 VITALS
HEART RATE: 80 BPM | HEIGHT: 66 IN | RESPIRATION RATE: 20 BRPM | OXYGEN SATURATION: 97 % | TEMPERATURE: 97.8 F | SYSTOLIC BLOOD PRESSURE: 132 MMHG | WEIGHT: 170 LBS | DIASTOLIC BLOOD PRESSURE: 82 MMHG | BODY MASS INDEX: 27.32 KG/M2

## 2018-12-16 DIAGNOSIS — J18.9 PNEUMONIA DUE TO INFECTIOUS ORGANISM, UNSPECIFIED LATERALITY, UNSPECIFIED PART OF LUNG: Primary | ICD-10-CM

## 2018-12-16 DIAGNOSIS — R05.9 COUGH: ICD-10-CM

## 2018-12-16 PROCEDURE — 71046 X-RAY EXAM CHEST 2 VIEWS: CPT

## 2018-12-16 PROCEDURE — 99213 OFFICE O/P EST LOW 20 MIN: CPT | Performed by: PHYSICIAN ASSISTANT

## 2018-12-16 RX ORDER — ALBUTEROL SULFATE 90 UG/1
2 AEROSOL, METERED RESPIRATORY (INHALATION) EVERY 6 HOURS PRN
Qty: 18 G | Refills: 0 | Status: SHIPPED | OUTPATIENT
Start: 2018-12-16 | End: 2019-04-29 | Stop reason: SDUPTHER

## 2018-12-16 RX ORDER — AZITHROMYCIN 250 MG/1
TABLET, FILM COATED ORAL
Qty: 6 TABLET | Refills: 0 | Status: SHIPPED | OUTPATIENT
Start: 2018-12-16 | End: 2018-12-20

## 2018-12-16 RX ORDER — PROMETHAZINE HYDROCHLORIDE AND CODEINE PHOSPHATE 6.25; 1 MG/5ML; MG/5ML
5 SYRUP ORAL EVERY 4 HOURS PRN
Qty: 120 ML | Refills: 0 | Status: SHIPPED | OUTPATIENT
Start: 2018-12-16 | End: 2019-04-15 | Stop reason: ALTCHOICE

## 2018-12-16 NOTE — PROGRESS NOTES
[facility]        NAME: Maddy Rivera is a 60 y o  female  : 1955    MRN: [id number]  DATE: 2018  TIME: 3:47 PM    Assessment and Plan   Pneumonia due to infectious organism, unspecified laterality, unspecified part of lung [J18 9]  1  Pneumonia due to infectious organism, unspecified laterality, unspecified part of lung  XR chest pa & lateral    azithromycin (ZITHROMAX) 250 mg tablet    albuterol (VENTOLIN HFA) 90 mcg/act inhaler    promethazine-codeine (PHENERGAN WITH CODEINE) 6 25-10 mg/5 mL syrup     Chest xray reviewed by myself; There is moderate haziness throughout  There is no consolidation noted  I will treat for pneumonia  Patient Instructions     Follow up with PCP in 3-5 days  Proceed to  ER if symptoms worsen  Chief Complaint     Chief Complaint   Patient presents with    Cough     x6 days with a productive cough, wheezing and sob , there is also fatigue (denies fevers, ear pain, denies n/v/d)         History of Present Illness       60 y o  Female presents with congestion and cough for one week  She states that the congestion and pressure she felt earlier in the week has resolved  She feels the congestion is in her chest  She complains of wheezing and occasional SOB  No hx of asthma or COPD  Patient is not a smoker but lives with two people that smoke in the house  Review of Systems   Review of Systems   Constitutional: Negative for chills, fatigue and fever  HENT: Negative for congestion, ear pain, sinus pain, sore throat and trouble swallowing  Eyes: Negative for pain, discharge and redness  Respiratory: Negative for cough, chest tightness, shortness of breath and wheezing  Cardiovascular: Negative for chest pain, palpitations and leg swelling  Gastrointestinal: Negative for abdominal pain, diarrhea, nausea and vomiting  Musculoskeletal: Negative for arthralgias, joint swelling and myalgias  Skin: Negative for rash     Neurological: Negative for dizziness, weakness, numbness and headaches  Current Medications       Current Outpatient Prescriptions:     albuterol (VENTOLIN HFA) 90 mcg/act inhaler, Inhale 2 puffs every 6 (six) hours as needed for wheezing, Disp: 18 g, Rfl: 0    azithromycin (ZITHROMAX) 250 mg tablet, Take 2 tablets today then 1 tablet daily x 4 days, Disp: 6 tablet, Rfl: 0    promethazine-codeine (PHENERGAN WITH CODEINE) 6 25-10 mg/5 mL syrup, Take 5 mL by mouth every 4 (four) hours as needed for cough, Disp: 120 mL, Rfl: 0    Current Allergies     Allergies as of 12/16/2018 - Reviewed 12/16/2018   Allergen Reaction Noted    Tramadol GI Intolerance 02/02/2018            The following portions of the patient's history were reviewed and updated as appropriate: allergies, current medications, past family history, past medical history, past social history, past surgical history and problem list      Past Medical History:   Diagnosis Date    Arthritis     Degenerative joint disease of right hip     High cholesterol     treated with diet changes and fish oil    Hip pain        Past Surgical History:   Procedure Laterality Date    HYSTERECTOMY      MS COLONOSCOPY FLX DX W/COLLJ SPEC WHEN PFRMD N/A 2/3/2018    Procedure: COLONOSCOPY;  Surgeon: Manley Babinski, MD;  Location: [facility]; Service: Gastroenterology    MS TOTAL HIP ARTHROPLASTY Right 4/18/2016    Procedure: TOTAL HIP ARTHROPLASTY ;  Surgeon: Danika Montaño MD;  Location:  MAIN OR;  Service: Orthopedics    SHOULDER SURGERY Right     reconstruction for chronic anterior dislocation    TONSILLECTOMY      with adenoidectomy    TUBAL LIGATION         Family History   Problem Relation Age of Onset    Hypertension Mother     Alzheimer's disease Mother     COPD Father     Pulmonary fibrosis Father          Medications have been verified          Objective   /82   Pulse 80   Temp 97 8 °F (36 6 °C) (Tympanic)   Resp 20   Ht 5' 6" (1 676 m)   Wt 77 1 kg (170 lb)   LMP  (LMP Unknown)   SpO2 97%   BMI 27 44 kg/m²        Physical Exam     Physical Exam   Constitutional: She is oriented to person, place, and time  She appears well-developed and well-nourished  No distress  HENT:   Head: Normocephalic  Right Ear: External ear normal    Left Ear: External ear normal    Mouth/Throat: Oropharynx is clear and moist    Eyes: Pupils are equal, round, and reactive to light  Conjunctivae and EOM are normal    Neck: Normal range of motion  Neck supple  Cardiovascular: Normal rate, regular rhythm and normal heart sounds  No murmur heard  Pulmonary/Chest: Effort normal  No respiratory distress  She has no wheezes  She has rhonchi in the right lower field  Abdominal: Soft  Bowel sounds are normal  There is no tenderness  Musculoskeletal: Normal range of motion  Lymphadenopathy:     She has no cervical adenopathy  Neurological: She is alert and oriented to person, place, and time  She has normal reflexes  Skin: Skin is warm and dry  Psychiatric: She has a normal mood and affect  Nursing note and vitals reviewed

## 2018-12-27 ENCOUNTER — OFFICE VISIT (OUTPATIENT)
Dept: FAMILY MEDICINE CLINIC | Facility: MEDICAL CENTER | Age: 63
End: 2018-12-27
Payer: COMMERCIAL

## 2018-12-27 VITALS
HEART RATE: 76 BPM | OXYGEN SATURATION: 98 % | BODY MASS INDEX: 28.41 KG/M2 | TEMPERATURE: 97.7 F | WEIGHT: 176 LBS | SYSTOLIC BLOOD PRESSURE: 150 MMHG | RESPIRATION RATE: 16 BRPM | DIASTOLIC BLOOD PRESSURE: 90 MMHG

## 2018-12-27 DIAGNOSIS — R05.9 COUGH: Primary | ICD-10-CM

## 2018-12-27 PROCEDURE — 99214 OFFICE O/P EST MOD 30 MIN: CPT | Performed by: FAMILY MEDICINE

## 2018-12-27 PROCEDURE — 1036F TOBACCO NON-USER: CPT | Performed by: FAMILY MEDICINE

## 2018-12-27 RX ORDER — BENZONATATE 100 MG/1
CAPSULE ORAL
Qty: 60 CAPSULE | Refills: 0 | Status: SHIPPED | OUTPATIENT
Start: 2018-12-27 | End: 2019-04-15 | Stop reason: ALTCHOICE

## 2018-12-27 RX ORDER — PREDNISONE 20 MG/1
40 TABLET ORAL DAILY
Qty: 10 TABLET | Refills: 0 | Status: SHIPPED | OUTPATIENT
Start: 2018-12-27 | End: 2019-04-15 | Stop reason: ALTCHOICE

## 2018-12-27 NOTE — PROGRESS NOTES
Assessment/Plan:    No problem-specific Assessment & Plan notes found for this encounter  Diagnoses and all orders for this visit:    Cough  -     benzonatate (TESSALON PERLES) 100 mg capsule; Take 1-2 caps PO TID PRN cough  -     predniSONE 20 mg tablet; Take 2 tablets (40 mg total) by mouth daily  Records reviewed from recent visit to the urgent care  It appears patient was diagnosed clinically with pneumonia as the chest x-ray did not demonstrate a pneumonia  Based on symptoms and exam findings I suspect patient has post infectious cough  I will have her continue to use the albuterol inhaler around the clock for the next four days  I will have her start benzonatate as this may help her cough during the day and in the evening  I will have her start a course of prednisone to decrease inflammation in the lungs and thus help her cough  Patient to call the office or go to the ER if symptoms worsen or fever develops  Follow-up in one week if symptoms persist or sooner if needed  Subjective:      Patient ID: Sally Jordan is a 58 y o  female  Patient presents with a chief complaint of a cough  Started two weeks ago  No associated symptoms  Cough is nonproductive currently but initially was productive  Symptoms have been stable the past two weeks  Cough is exacerbated by activity  Cough is improved somewhat with an albuterol inhaler  Patient was seen in the urgent care last week and diagnosed with a pneumonia  She was placed on azithromycin but her cough persists  Patient is a nonsmoker but her  does smoke  No history of asthma  Albuterol was from the urgent care  She was also given codeine cough medication which has not been helpful          The following portions of the patient's history were reviewed and updated as appropriate:   She   Patient Active Problem List    Diagnosis Date Noted    Status post total replacement of right hip 04/21/2016    Hyperlipidemia 10/24/2013 Current Outpatient Prescriptions   Medication Sig Dispense Refill    albuterol (VENTOLIN HFA) 90 mcg/act inhaler Inhale 2 puffs every 6 (six) hours as needed for wheezing 18 g 0    promethazine-codeine (PHENERGAN WITH CODEINE) 6 25-10 mg/5 mL syrup Take 5 mL by mouth every 4 (four) hours as needed for cough 120 mL 0    benzonatate (TESSALON PERLES) 100 mg capsule Take 1-2 caps PO TID PRN cough 60 capsule 0    predniSONE 20 mg tablet Take 2 tablets (40 mg total) by mouth daily 10 tablet 0     No current facility-administered medications for this visit  She is allergic to tramadol       Review of Systems   Constitutional: Negative for fever  Respiratory: Negative for shortness of breath  Cardiovascular: Negative for chest pain  Objective:      /90 (BP Location: Left arm, Patient Position: Sitting, Cuff Size: Adult)   Pulse 76   Temp 97 7 °F (36 5 °C) (Oral)   Resp 16   Wt 79 8 kg (176 lb)   LMP  (LMP Unknown)   SpO2 98%   BMI 28 41 kg/m²          Physical Exam   Constitutional: Vital signs are normal  She appears well-developed and well-nourished  HENT:   Head: Normocephalic and atraumatic  Right Ear: Tympanic membrane, external ear and ear canal normal    Left Ear: Tympanic membrane, external ear and ear canal normal    Nose: No mucosal edema or rhinorrhea  Mouth/Throat: Uvula is midline, oropharynx is clear and moist and mucous membranes are normal    Eyes: Pupils are equal, round, and reactive to light  Conjunctivae, EOM and lids are normal    Neck: Trachea normal  Neck supple  Cardiovascular: Normal rate, regular rhythm and normal heart sounds  No murmur heard  Pulmonary/Chest: Effort normal and breath sounds normal    Lymphadenopathy:     She has no cervical adenopathy

## 2019-04-15 ENCOUNTER — OFFICE VISIT (OUTPATIENT)
Dept: FAMILY MEDICINE CLINIC | Facility: MEDICAL CENTER | Age: 64
End: 2019-04-15
Payer: COMMERCIAL

## 2019-04-15 ENCOUNTER — APPOINTMENT (OUTPATIENT)
Dept: RADIOLOGY | Facility: MEDICAL CENTER | Age: 64
End: 2019-04-15
Payer: COMMERCIAL

## 2019-04-15 VITALS
BODY MASS INDEX: 28.41 KG/M2 | DIASTOLIC BLOOD PRESSURE: 86 MMHG | RESPIRATION RATE: 16 BRPM | TEMPERATURE: 98.6 F | OXYGEN SATURATION: 96 % | HEART RATE: 76 BPM | SYSTOLIC BLOOD PRESSURE: 134 MMHG | WEIGHT: 176 LBS

## 2019-04-15 DIAGNOSIS — R06.2 WHEEZING: ICD-10-CM

## 2019-04-15 DIAGNOSIS — R05.9 COUGH: Primary | ICD-10-CM

## 2019-04-15 DIAGNOSIS — Z77.22 SECOND HAND SMOKE EXPOSURE: ICD-10-CM

## 2019-04-15 DIAGNOSIS — R05.9 COUGH: ICD-10-CM

## 2019-04-15 PROCEDURE — 71046 X-RAY EXAM CHEST 2 VIEWS: CPT

## 2019-04-15 PROCEDURE — 99214 OFFICE O/P EST MOD 30 MIN: CPT | Performed by: FAMILY MEDICINE

## 2019-04-15 PROCEDURE — 1036F TOBACCO NON-USER: CPT | Performed by: FAMILY MEDICINE

## 2019-04-15 RX ORDER — FLUTICASONE PROPIONATE 110 UG/1
2 AEROSOL, METERED RESPIRATORY (INHALATION) 2 TIMES DAILY
Qty: 1 INHALER | Refills: 0 | Status: SHIPPED | OUTPATIENT
Start: 2019-04-15 | End: 2019-05-15 | Stop reason: ALTCHOICE

## 2019-04-29 DIAGNOSIS — J18.9 PNEUMONIA DUE TO INFECTIOUS ORGANISM, UNSPECIFIED LATERALITY, UNSPECIFIED PART OF LUNG: ICD-10-CM

## 2019-04-29 RX ORDER — ALBUTEROL SULFATE 90 UG/1
2 AEROSOL, METERED RESPIRATORY (INHALATION) EVERY 6 HOURS PRN
Qty: 18 G | Refills: 0 | Status: SHIPPED | OUTPATIENT
Start: 2019-04-29 | End: 2019-09-03

## 2019-05-15 ENCOUNTER — OFFICE VISIT (OUTPATIENT)
Dept: FAMILY MEDICINE CLINIC | Facility: MEDICAL CENTER | Age: 64
End: 2019-05-15
Payer: COMMERCIAL

## 2019-05-15 VITALS
RESPIRATION RATE: 16 BRPM | HEART RATE: 80 BPM | BODY MASS INDEX: 27.97 KG/M2 | DIASTOLIC BLOOD PRESSURE: 80 MMHG | WEIGHT: 174 LBS | HEIGHT: 66 IN | SYSTOLIC BLOOD PRESSURE: 142 MMHG

## 2019-05-15 DIAGNOSIS — R05.9 COUGH: ICD-10-CM

## 2019-05-15 DIAGNOSIS — E78.5 HYPERLIPIDEMIA, UNSPECIFIED HYPERLIPIDEMIA TYPE: ICD-10-CM

## 2019-05-15 DIAGNOSIS — Z12.11 SCREEN FOR COLON CANCER: ICD-10-CM

## 2019-05-15 DIAGNOSIS — R06.2 WHEEZING: Primary | ICD-10-CM

## 2019-05-15 DIAGNOSIS — L90.5 SCAR: ICD-10-CM

## 2019-05-15 PROCEDURE — 94640 AIRWAY INHALATION TREATMENT: CPT

## 2019-05-15 PROCEDURE — 3008F BODY MASS INDEX DOCD: CPT

## 2019-05-15 PROCEDURE — 99213 OFFICE O/P EST LOW 20 MIN: CPT

## 2019-05-15 PROCEDURE — 1036F TOBACCO NON-USER: CPT

## 2019-05-15 RX ORDER — ALBUTEROL SULFATE 2.5 MG/3ML
2.5 SOLUTION RESPIRATORY (INHALATION) ONCE
Status: COMPLETED | OUTPATIENT
Start: 2019-05-15 | End: 2019-05-15

## 2019-05-15 RX ORDER — FLUTICASONE PROPIONATE 110 UG/1
2 AEROSOL, METERED RESPIRATORY (INHALATION) 2 TIMES DAILY
Qty: 1 INHALER | Refills: 0 | Status: SHIPPED | OUTPATIENT
Start: 2019-05-15 | End: 2019-07-19 | Stop reason: SDUPTHER

## 2019-05-15 RX ORDER — ALBUTEROL SULFATE 2.5 MG/3ML
2.5 SOLUTION RESPIRATORY (INHALATION) EVERY 6 HOURS PRN
Qty: 30 VIAL | Refills: 1 | Status: SHIPPED | OUTPATIENT
Start: 2019-05-15 | End: 2019-09-03

## 2019-05-15 RX ADMIN — ALBUTEROL SULFATE 2.5 MG: 2.5 SOLUTION RESPIRATORY (INHALATION) at 10:24

## 2019-05-16 ENCOUNTER — TELEPHONE (OUTPATIENT)
Dept: FAMILY MEDICINE CLINIC | Facility: MEDICAL CENTER | Age: 64
End: 2019-05-16

## 2019-05-17 ENCOUNTER — TELEPHONE (OUTPATIENT)
Dept: FAMILY MEDICINE CLINIC | Facility: MEDICAL CENTER | Age: 64
End: 2019-05-17

## 2019-05-17 DIAGNOSIS — R06.2 WHEEZING: Primary | ICD-10-CM

## 2019-05-20 DIAGNOSIS — R06.2 WHEEZING: Primary | ICD-10-CM

## 2019-06-05 ENCOUNTER — HOSPITAL ENCOUNTER (OUTPATIENT)
Dept: PULMONOLOGY | Facility: HOSPITAL | Age: 64
Discharge: HOME | End: 2019-06-05
Payer: COMMERCIAL

## 2019-06-05 DIAGNOSIS — R06.2 WHEEZING: ICD-10-CM

## 2019-06-05 PROCEDURE — 94760 N-INVAS EAR/PLS OXIMETRY 1: CPT

## 2019-06-05 PROCEDURE — 94060 EVALUATION OF WHEEZING: CPT | Performed by: INTERNAL MEDICINE

## 2019-06-05 PROCEDURE — 94726 PLETHYSMOGRAPHY LUNG VOLUMES: CPT | Performed by: INTERNAL MEDICINE

## 2019-06-05 PROCEDURE — 94060 EVALUATION OF WHEEZING: CPT

## 2019-06-05 RX ORDER — ALBUTEROL SULFATE 2.5 MG/3ML
2.5 SOLUTION RESPIRATORY (INHALATION) ONCE
Status: COMPLETED | OUTPATIENT
Start: 2019-06-05 | End: 2019-06-05

## 2019-06-05 RX ADMIN — ALBUTEROL SULFATE 2.5 MG: 2.5 SOLUTION RESPIRATORY (INHALATION) at 12:44

## 2019-06-14 ENCOUNTER — TELEPHONE (OUTPATIENT)
Dept: FAMILY MEDICINE CLINIC | Facility: MEDICAL CENTER | Age: 64
End: 2019-06-14

## 2019-07-19 DIAGNOSIS — R06.2 WHEEZING: ICD-10-CM

## 2019-07-19 DIAGNOSIS — R05.9 COUGH: ICD-10-CM

## 2019-07-21 RX ORDER — DEXAMETHASONE 4 MG/1
TABLET ORAL
Qty: 12 G | Refills: 0 | Status: SHIPPED | OUTPATIENT
Start: 2019-07-21 | End: 2019-09-03

## 2019-08-01 ENCOUNTER — CONSULT (OUTPATIENT)
Dept: PULMONOLOGY | Facility: CLINIC | Age: 64
End: 2019-08-01
Payer: COMMERCIAL

## 2019-08-01 ENCOUNTER — APPOINTMENT (OUTPATIENT)
Dept: LAB | Facility: CLINIC | Age: 64
End: 2019-08-01
Payer: COMMERCIAL

## 2019-08-01 VITALS
SYSTOLIC BLOOD PRESSURE: 118 MMHG | WEIGHT: 172 LBS | BODY MASS INDEX: 27.64 KG/M2 | HEART RATE: 86 BPM | OXYGEN SATURATION: 95 % | DIASTOLIC BLOOD PRESSURE: 70 MMHG | HEIGHT: 66 IN

## 2019-08-01 DIAGNOSIS — R05.3 CHRONIC COUGH: Primary | ICD-10-CM

## 2019-08-01 DIAGNOSIS — J45.20 MILD INTERMITTENT REACTIVE AIRWAY DISEASE WITHOUT COMPLICATION: ICD-10-CM

## 2019-08-01 PROCEDURE — 86003 ALLG SPEC IGE CRUDE XTRC EA: CPT

## 2019-08-01 PROCEDURE — 86331 IMMUNODIFFUSION OUCHTERLONY: CPT

## 2019-08-01 PROCEDURE — 99214 OFFICE O/P EST MOD 30 MIN: CPT | Performed by: INTERNAL MEDICINE

## 2019-08-01 PROCEDURE — 36415 COLL VENOUS BLD VENIPUNCTURE: CPT

## 2019-08-01 PROCEDURE — 82785 ASSAY OF IGE: CPT

## 2019-08-01 PROCEDURE — 86606 ASPERGILLUS ANTIBODY: CPT

## 2019-08-01 PROCEDURE — 86602 ANTINOMYCES ANTIBODY: CPT

## 2019-08-01 PROCEDURE — 86671 FUNGUS NES ANTIBODY: CPT

## 2019-08-01 NOTE — PROGRESS NOTES
Assessment/Plan:     Diagnoses and all orders for this visit:    Chronic cough    Mild intermittent reactive airway disease without complication  -     [facility] Allergy Panel, Adult; Future  -     Hypersensitivity pnuemonitis profile; Future        Chronic cough likely secondary to cough variant asthma  Will get respiratory allergy panel with IgE  Hypersensitivity panel  Call patient with results  Recent spirometry in June 2019 has been completely normal with an FEV1 of 97%  Continue with Flovent 110 µgpuffs twice daily  Rinse mouth after use  Continue with Ventolin MDI 2 puffs 4 times daily as needed  MDI technique reviewed with the patient  Follow-up in 6 months or when necessary earlier as needed  Return in about 6 months (around 2/1/2020)  All questions are answered to the patient's satisfaction and understanding  She verbalizes understanding  She is encouraged to call with any further questions or concerns  Portions of the record may have been created with voice recognition software  Occasional wrong word or "sound a like" substitutions may have occurred due to the inherent limitations of voice recognition software  Read the chart carefully and recognize, using context, where substitutions have occurred  a    Electronically Signed by Adina Yeung MD    ______________________________________________________________________    Chief Complaint:   Chief Complaint   Patient presents with    Cough        Patient ID: Loetta Aschoff is a 58 y o  y o  female has a past medical history of Arthritis, Degenerative joint disease of right hip, High cholesterol, and Hip pain  8/1/2019  Patient presents today for initial visit    Ms Micky Boyd is a very pleasant 41-year-old lady,Who has no smoking history, complaining of cough chronic dry nonproductive as well as occasional wheezing,      Occupational/Exposure history:  Pets/Enviroment: dogs and cats at home   Travel history:  Review of Systems   Constitutional: Negative for appetite change, chills, diaphoresis, fatigue, fever and unexpected weight change  HENT: Negative for congestion, ear discharge, ear pain, nosebleeds, postnasal drip, rhinorrhea, sinus pain, sore throat and voice change  Eyes: Negative for pain, discharge and visual disturbance  Respiratory: Positive for cough, shortness of breath and wheezing  Negative for apnea, choking, chest tightness and stridor  Cardiovascular: Negative for chest pain, palpitations and leg swelling  Gastrointestinal: Negative for abdominal pain, blood in stool, constipation, diarrhea and vomiting  Endocrine: Negative for cold intolerance, heat intolerance, polydipsia, polyphagia and polyuria  Genitourinary: Negative for difficulty urinating and dysuria  Musculoskeletal: Negative for arthralgias and neck pain  Skin: Negative for pallor and rash  Allergic/Immunologic: Negative for environmental allergies and food allergies  Neurological: Negative for dizziness, speech difficulty, weakness and light-headedness  Hematological: Negative for adenopathy  Does not bruise/bleed easily  Psychiatric/Behavioral: Negative for agitation, confusion and sleep disturbance  The patient is not nervous/anxious  Social history: She reports that she has never smoked  She has never used smokeless tobacco  She reports that she drinks alcohol  She reports that she does not use drugs  Past surgical history:   Past Surgical History:   Procedure Laterality Date    HYSTERECTOMY      NV COLONOSCOPY FLX DX W/COLLJ SPEC WHEN PFRMD N/A 2/3/2018    Procedure: COLONOSCOPY;  Surgeon: Marlea Sacks, MD;  Location: [facility];   Service: Gastroenterology    NV TOTAL HIP ARTHROPLASTY Right 4/18/2016    Procedure: TOTAL HIP ARTHROPLASTY ;  Surgeon: Jerry Umana MD;  [facility]: [facility];  Service: Orthopedics    SHOULDER SURGERY Right     reconstruction for chronic anterior dislocation    TONSILLECTOMY      with adenoidectomy    TUBAL LIGATION       Family history:   Family History   Problem Relation Age of Onset    Hypertension Mother     Alzheimer's disease Mother     COPD Father     Pulmonary fibrosis Father     Asthma Sister        Immunization History   Administered Date(s) Administered    H1N1, All Formulations 01/11/2010     Current Outpatient Medications   Medication Sig Dispense Refill    albuterol (2 5 mg/3 mL) 0 083 % nebulizer solution Take 1 vial (2 5 mg total) by nebulization every 6 (six) hours as needed for wheezing or shortness of breath 30 vial 1    albuterol (VENTOLIN HFA) 90 mcg/act inhaler Inhale 2 puffs every 6 (six) hours as needed for wheezing 18 g 0    FLOVENT  MCG/ACT inhaler inhale 2 puffs by mouth twice a day Rinse mouth after use (Patient taking differently: as needed ) 12 g 0     No current facility-administered medications for this visit  Allergies: Tramadol    Objective:  Vitals:    08/01/19 1309   BP: 118/70   Pulse: 86   SpO2: 95%   Weight: 78 kg (172 lb)   Height: 5' 6" (1 676 m)   Oxygen Therapy  SpO2: 95 %    Wt Readings from Last 3 Encounters:   08/08/19 78 5 kg (173 lb)   08/01/19 78 kg (172 lb)   05/15/19 78 9 kg (174 lb)     Body mass index is 27 76 kg/m²  Physical Exam   Constitutional: She is oriented to person, place, and time  She appears well-developed and well-nourished  HENT:   Head: Normocephalic and atraumatic  Eyes: Pupils are equal, round, and reactive to light  Conjunctivae are normal    Neck: Normal range of motion  Neck supple  No JVD present  No thyromegaly present  Cardiovascular: Normal rate, regular rhythm and normal heart sounds  Exam reveals no gallop and no friction rub  No murmur heard  Pulmonary/Chest: Effort normal and breath sounds normal  No respiratory distress  She has no wheezes  She has no rales  She exhibits no tenderness  Abdominal: Soft  Bowel sounds are normal    Musculoskeletal: Normal range of motion   She exhibits no edema, tenderness or deformity  Lymphadenopathy:     She has no cervical adenopathy  Neurological: She is alert and oriented to person, place, and time  Skin: Skin is warm and dry  Psychiatric: She has a normal mood and affect  Nursing note and vitals reviewed

## 2019-08-01 NOTE — PROGRESS NOTES
Assessment/Plan:     [facility]65077}      No follow-ups on file  All questions are answered to the patient's satisfaction and understanding  She verbalizes understanding  She is encouraged to call with any further questions or concerns  Portions of the record may have been created with voice recognition software  Occasional wrong word or "sound a like" substitutions may have occurred due to the inherent limitations of voice recognition software  Read the chart carefully and recognize, using context, where substitutions have occurred  a    Electronically Signed by Jeana Nolen MD    ______________________________________________________________________    Chief Complaint:   Chief Complaint   Patient presents with    Cough        Patient ID: Kati Akbar is a 58 y o  y o  female has a past medical history of Arthritis, Degenerative joint disease of right hip, High cholesterol, and Hip pain  8/1/2019  Patient presents today for initial visit  HPI    Occupational/Exposure history:  Pets/Enviroment:[employer]  Travel history:  Review of Systems    Social history: She reports that she has never smoked  She has never used smokeless tobacco  She reports that she drinks alcohol  She reports that she does not use drugs  Past surgical history:   Past Surgical History:   Procedure Laterality Date    HYSTERECTOMY      MD COLONOSCOPY FLX DX W/COLLJ SPEC WHEN PFRMD N/A 2/3/2018    Procedure: COLONOSCOPY;  Surgeon: Ruchi Miller MD;  Location: [facility];   Service: Gastroenterology    MD TOTAL HIP ARTHROPLASTY Right 4/18/2016    Procedure: TOTAL HIP ARTHROPLASTY ;  Surgeon: Jairo Gaffney MD;  Location:  MAIN OR;  Service: Orthopedics    SHOULDER SURGERY Right     reconstruction for chronic anterior dislocation    TONSILLECTOMY      with adenoidectomy    TUBAL LIGATION       Family history:   Family History   Problem Relation Age of Onset    Hypertension Mother     Alzheimer's disease Mother     COPD Father     Pulmonary fibrosis Father     Asthma Sister        Immunization History   Administered Date(s) Administered    H1N1, All Formulations 01/11/2010     Current Outpatient Medications   Medication Sig Dispense Refill    albuterol (2 5 mg/3 mL) 0 083 % nebulizer solution Take 1 vial (2 5 mg total) by nebulization every 6 (six) hours as needed for wheezing or shortness of breath 30 vial 1    albuterol (VENTOLIN HFA) 90 mcg/act inhaler Inhale 2 puffs every 6 (six) hours as needed for wheezing 18 g 0    FLOVENT  MCG/ACT inhaler inhale 2 puffs by mouth twice a day Rinse mouth after use 12 g 0     No current facility-administered medications for this visit  Allergies: Tramadol    Objective:  Vitals:    08/01/19 1309   BP: 118/70   Pulse: 86   SpO2: 95%   Weight: 78 kg (172 lb)   Height: 5' 6" (1 676 m)   Oxygen Therapy  SpO2: 95 %    Wt Readings from Last 3 Encounters:   08/01/19 78 kg (172 lb)   05/15/19 78 9 kg (174 lb)   04/15/19 79 8 kg (176 lb)     Body mass index is 27 76 kg/m²  Physical Exam    Lab Review:   {Recent KHII:80299::"XEB applicable"}    Diagnostics:  {Results Review Statement:43460}  {DIAGNOSTICS:38076}  Office Spirometry Results:     ESS:    No results found

## 2019-08-06 LAB
A ALTERNATA IGE QN: <0.1 KUA/I
A FUMIGATUS IGE QN: <0.1 KUA/I
ALLERGEN COMMENT: ABNORMAL
BERMUDA GRASS IGE QN: <0.1 KUA/I
BOXELDER IGE QN: <0.1 KUA/I
C HERBARUM IGE QN: <0.1 KUA/I
CAT DANDER IGE QN: <0.1 KUA/I
CMN PIGWEED IGE QN: <0.1 KUA/I
COMMON RAGWEED IGE QN: <0.1 KUA/I
COTTONWOOD IGE QN: <0.1 KUA/I
D FARINAE IGE QN: <0.1 KUA/I
D PTERONYSS IGE QN: <0.1 KUA/I
DOG DANDER IGE QN: <0.1 KUA/I
LONDON PLANE IGE QN: <0.1 KUA/I
MOUSE URINE PROT IGE QN: <0.1 KUA/I
MT JUNIPER IGE QN: <0.1 KUA/I
MUGWORT IGE QN: <0.1 KUA/I
P NOTATUM IGE QN: <0.1 KUA/I
ROACH IGE QN: 0.18 KUA/I
SHEEP SORREL IGE QN: <0.1 KUA/I
SILVER BIRCH IGE QN: <0.1 KUA/I
TIMOTHY IGE QN: <0.1 KUA/I
TOTAL IGE SMQN RAST: 36.3 KU/L (ref 0–113)
WALNUT IGE QN: <0.1 KUA/I
WHITE ASH IGE QN: 0.56 KUA/I
WHITE ELM IGE QN: <0.1 KUA/I
WHITE MULBERRY IGE QN: <0.1 KUA/I
WHITE OAK IGE QN: <0.1 KUA/I

## 2019-08-07 LAB
A FUMIGATUS1 AB SER QL ID: NEGATIVE
A PULLULANS AB SER QL: NEGATIVE
LACEYELLA SACCHARI AB SER QL: NEGATIVE
PIGEON SERUM AB QL ID: NEGATIVE
S RECTIVIRGULA AB SER QL ID: NEGATIVE
T VULGARIS AB SER QL ID: NEGATIVE

## 2019-08-08 ENCOUNTER — OFFICE VISIT (OUTPATIENT)
Dept: URGENT CARE | Facility: CLINIC | Age: 64
End: 2019-08-08
Payer: COMMERCIAL

## 2019-08-08 VITALS
SYSTOLIC BLOOD PRESSURE: 126 MMHG | HEIGHT: 66 IN | DIASTOLIC BLOOD PRESSURE: 72 MMHG | RESPIRATION RATE: 18 BRPM | HEART RATE: 80 BPM | OXYGEN SATURATION: 97 % | TEMPERATURE: 97.3 F | WEIGHT: 173 LBS | BODY MASS INDEX: 27.8 KG/M2

## 2019-08-08 DIAGNOSIS — M70.32 BURSITIS OF LEFT ELBOW, UNSPECIFIED BURSA: Primary | ICD-10-CM

## 2019-08-08 PROCEDURE — 99213 OFFICE O/P EST LOW 20 MIN: CPT | Performed by: PHYSICIAN ASSISTANT

## 2019-08-08 RX ORDER — CEPHALEXIN 500 MG/1
500 CAPSULE ORAL EVERY 6 HOURS SCHEDULED
Qty: 28 CAPSULE | Refills: 0 | Status: SHIPPED | OUTPATIENT
Start: 2019-08-08 | End: 2019-08-15

## 2019-08-08 NOTE — PROGRESS NOTES
[facility]        NAME: Wes Cedeno is a 58 y o  female  : 1955    MRN: [id number]  DATE: 2019  TIME: 7:51 PM    Assessment and Plan   Bursitis of left elbow, unspecified bursa [M70 32]  1  Bursitis of left elbow, unspecified bursa  cephalexin (KEFLEX) 500 mg capsule     Over-the-counter ibuprofen as directed  Ice 20 min every 3-4 hours   Antibiotics as directed     Patient Instructions       Follow up with PCP in 3-5 days  Proceed to  ER if symptoms worsen  Chief Complaint     Chief Complaint   Patient presents with    Elbow Pain     x a few days  L elbow is sore, swollen, and warm to the touch  denies injury or pain with ROM  History of Present Illness       21-year-old female presents for evaluation of left elbow pain  Patient complains of left elbow swelling and redness  She states today she noted a lump to the touch  Patient states she sits at a desk all day at work and states she does lean on her elbow often  Patient has no history of bursitis  She denies any falls or trauma to the elbow  Patient states she had blood work done a week ago and unsure if this is related  She has no history of VTE  Review of Systems   Review of Systems   Constitutional: Negative for chills, fatigue and fever  HENT: Negative for congestion, ear pain, sinus pain, sore throat and trouble swallowing  Eyes: Negative for pain, discharge and redness  Respiratory: Negative for cough, chest tightness, shortness of breath and wheezing  Cardiovascular: Negative for chest pain, palpitations and leg swelling  Gastrointestinal: Negative for abdominal pain, diarrhea, nausea and vomiting  Musculoskeletal: Positive for joint swelling  Negative for arthralgias and myalgias  Skin: Negative for rash  Neurological: Negative for dizziness, weakness, numbness and headaches           Current Medications       Current Outpatient Medications:     albuterol (2 5 mg/3 mL) 0 083 % nebulizer solution, Take 1 vial (2 5 mg total) by nebulization every 6 (six) hours as needed for wheezing or shortness of breath, Disp: 30 vial, Rfl: 1    albuterol (VENTOLIN HFA) 90 mcg/act inhaler, Inhale 2 puffs every 6 (six) hours as needed for wheezing, Disp: 18 g, Rfl: 0    FLOVENT  MCG/ACT inhaler, inhale 2 puffs by mouth twice a day Rinse mouth after use (Patient taking differently: as needed ), Disp: 12 g, Rfl: 0    cephalexin (KEFLEX) 500 mg capsule, Take 1 capsule (500 mg total) by mouth every 6 (six) hours for 7 days, Disp: 28 capsule, Rfl: 0    Current Allergies     Allergies as of 08/08/2019 - Reviewed 08/08/2019   Allergen Reaction Noted    Tramadol GI Intolerance 02/02/2018            The following portions of the patient's history were reviewed and updated as appropriate: allergies, current medications, past family history, past medical history, past social history, past surgical history and problem list      Past Medical History:   Diagnosis Date    Arthritis     Degenerative joint disease of right hip     High cholesterol     treated with diet changes and fish oil    Hip pain        Past Surgical History:   Procedure Laterality Date    HYSTERECTOMY      [place] COLONOSCOPY FLX DX W/COLLJ SPEC WHEN PFRMD N/A 2/3/2018    Procedure: COLONOSCOPY;  Surgeon: Parisa Gallegos MD;  Location: [facility]; Service: Gastroenterology    TX TOTAL HIP ARTHROPLASTY Right 4/18/2016    Procedure: TOTAL HIP ARTHROPLASTY ;  Surgeon: Isha Ramirez MD;  [facility];  Service: Orthopedics    SHOULDER SURGERY Right     reconstruction for chronic anterior dislocation    TONSILLECTOMY      with adenoidectomy    TUBAL LIGATION         Family History   Problem Relation Age of Onset    Hypertension Mother     Alzheimer's disease Mother     COPD Father     Pulmonary fibrosis Father     Asthma Sister          Medications have been verified          Objective   /72 (BP Location: Left arm, Patient Position: Sitting)   Pulse 80   Temp (!) 97 3 °F (36 3 °C) (Tympanic)   Resp 18   Ht 5' 6" (1 676 m)   Wt 78 5 kg (173 lb)   LMP  (LMP Unknown)   SpO2 97%   BMI 27 92 kg/m²        Physical Exam     Physical Exam   Constitutional: Vital signs are normal  She appears well-developed  No distress  Cardiovascular: Normal rate, regular rhythm, normal heart sounds and intact distal pulses     Pulmonary/Chest: Effort normal and breath sounds normal    Musculoskeletal:        Right elbow: Normal        Right forearm: Normal         Left forearm: Normal         Arms:

## 2019-08-13 ENCOUNTER — TELEPHONE (OUTPATIENT)
Dept: PULMONOLOGY | Facility: CLINIC | Age: 64
End: 2019-08-13

## 2019-09-03 ENCOUNTER — OFFICE VISIT (OUTPATIENT)
Dept: FAMILY MEDICINE CLINIC | Facility: MEDICAL CENTER | Age: 64
End: 2019-09-03
Payer: COMMERCIAL

## 2019-09-03 VITALS
BODY MASS INDEX: 27.76 KG/M2 | WEIGHT: 172 LBS | DIASTOLIC BLOOD PRESSURE: 84 MMHG | HEART RATE: 78 BPM | SYSTOLIC BLOOD PRESSURE: 138 MMHG | OXYGEN SATURATION: 98 %

## 2019-09-03 DIAGNOSIS — Z13.820 SCREENING FOR OSTEOPOROSIS: ICD-10-CM

## 2019-09-03 DIAGNOSIS — Z23 NEED FOR PNEUMOCOCCAL VACCINATION: ICD-10-CM

## 2019-09-03 DIAGNOSIS — R05.9 COUGH: ICD-10-CM

## 2019-09-03 DIAGNOSIS — E78.5 HYPERLIPIDEMIA, UNSPECIFIED HYPERLIPIDEMIA TYPE: ICD-10-CM

## 2019-09-03 DIAGNOSIS — E66.3 OVERWEIGHT: ICD-10-CM

## 2019-09-03 DIAGNOSIS — Z00.00 ROUTINE ADULT HEALTH MAINTENANCE: ICD-10-CM

## 2019-09-03 DIAGNOSIS — Z23 NEED FOR SHINGLES VACCINE: Primary | ICD-10-CM

## 2019-09-03 DIAGNOSIS — E55.9 VITAMIN D DEFICIENCY: ICD-10-CM

## 2019-09-03 DIAGNOSIS — Z12.31 VISIT FOR SCREENING MAMMOGRAM: ICD-10-CM

## 2019-09-03 PROCEDURE — 90471 IMMUNIZATION ADMIN: CPT | Performed by: FAMILY MEDICINE

## 2019-09-03 PROCEDURE — 90670 PCV13 VACCINE IM: CPT | Performed by: FAMILY MEDICINE

## 2019-09-03 PROCEDURE — 99213 OFFICE O/P EST LOW 20 MIN: CPT | Performed by: FAMILY MEDICINE

## 2019-09-03 NOTE — PROGRESS NOTES
Ronn Edmonds is here for 6 month f/u  She saw pulmonology [facility] for her cough variant asthma  Getting better  No complaints  She has been under lot of stress with her daughter and grandson living with her  Daughter has depression and is can be difficult to live with   continues to smoke in the house which aggravates Bonilla respiratory symptoms  She is hoping to be buying a new house soon with more room  Plans on retiring in the near future  O: /84 (BP Location: Left arm, Patient Position: Sitting, Cuff Size: Adult)   Pulse 78   Wt 78 kg (172 lb)   LMP  (LMP Unknown)   SpO2 98%   BMI 27 76 kg/m²   BP by me 134/78  Physical Exam   Constitutional: She appears well-developed and well-nourished  No distress  Neck: Carotid bruit is not present  No thyromegaly present  Cardiovascular: Normal rate, regular rhythm, normal heart sounds and intact distal pulses  Pulmonary/Chest: Effort normal and breath sounds normal    Abdominal: Soft  Bowel sounds are normal  She exhibits no mass  There is no hepatomegaly  There is no tenderness  Musculoskeletal: She exhibits no edema  Lymphadenopathy:     She has no cervical adenopathy  Assessment  1  Health maintenance-due for mammogram DEXA scan and blood work  Colonoscopy due (needs 1 year f/u from 2018) Updated immunizations  Given Prevnar today  2   Cough variant asthma-improved with Flovent which she is now discontinued  Advised pneumonia shots  3   Stress-discussed    Plan  Check blood work, mammogram, DEXA scan  Prevnar today  Recheck in gyn exam 6 months

## 2019-10-18 ENCOUNTER — TELEPHONE (OUTPATIENT)
Dept: GASTROENTEROLOGY | Facility: CLINIC | Age: 64
End: 2019-10-18

## 2019-10-20 ENCOUNTER — OFFICE VISIT (OUTPATIENT)
Dept: URGENT CARE | Facility: CLINIC | Age: 64
End: 2019-10-20
Payer: COMMERCIAL

## 2019-10-20 VITALS
HEIGHT: 66 IN | WEIGHT: 175 LBS | TEMPERATURE: 97.9 F | RESPIRATION RATE: 16 BRPM | SYSTOLIC BLOOD PRESSURE: 166 MMHG | HEART RATE: 66 BPM | OXYGEN SATURATION: 97 % | DIASTOLIC BLOOD PRESSURE: 92 MMHG | BODY MASS INDEX: 28.12 KG/M2

## 2019-10-20 DIAGNOSIS — S30.861A TICK BITE OF ABDOMEN, INITIAL ENCOUNTER: Primary | ICD-10-CM
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
## 2019-10-20 PROCEDURE — 99213 OFFICE O/P EST LOW 20 MIN: CPT | Performed by: PHYSICIAN ASSISTANT

## 2019-10-20 RX ORDER — DOXYCYCLINE HYCLATE 100 MG/1
100 CAPSULE ORAL EVERY 12 HOURS SCHEDULED
Qty: 20 CAPSULE | Refills: 0 | Status: SHIPPED | OUTPATIENT
Start: 2019-10-20 | End: 2019-10-30

## 2019-10-20 NOTE — PROGRESS NOTES
[facility]        NAME: Christiana Herebrt is a 58 y o  female  : 1955    MRN: [id number]  DATE: 2019  TIME: 11:23 AM    Assessment and Plan   Tick bite of abdomen, initial encounter [P15 177G, W57  XXXA]  1  Tick bite of abdomen, initial encounter  doxycycline hyclate (VIBRAMYCIN) 100 mg capsule         Patient Instructions     Follow up with PCP in 3-5 days  Proceed to  ER if symptoms worsen  Chief Complaint     Chief Complaint   Patient presents with    Tick Removal     Pt states her  removed a tick from her L side last night  Thinks he got it all  Site is red/purple and sore         History of Present Illness       74-year-old female presents for evaluation of tick bite to the left abdomen  Patient states last night the tick was visualized and removed by her   Patient now complaining of tenderness over the area with bruising  Patient has history of Lyme disease  She denies any fever chills  Denies rash  Review of Systems   Review of Systems   Constitutional: Negative for chills, fatigue and fever  HENT: Negative for congestion, ear pain, sinus pain, sore throat and trouble swallowing  Eyes: Negative for pain, discharge and redness  Respiratory: Negative for cough, chest tightness, shortness of breath and wheezing  Cardiovascular: Negative for chest pain, palpitations and leg swelling  Gastrointestinal: Negative for abdominal pain, diarrhea, nausea and vomiting  Musculoskeletal: Negative for arthralgias, joint swelling and myalgias  Skin: Positive for wound  Negative for rash  Neurological: Negative for dizziness, weakness, numbness and headaches           Current Medications       Current Outpatient Medications:     doxycycline hyclate (VIBRAMYCIN) 100 mg capsule, Take 1 capsule (100 mg total) by mouth every 12 (twelve) hours for 10 days, Disp: 20 capsule, Rfl: 0    Current Allergies     Allergies as of 10/20/2019 - Reviewed 10/20/2019 Allergen Reaction Noted    Tramadol GI Intolerance 02/02/2018            The following portions of the patient's history were reviewed and updated as appropriate: allergies, current medications, past family history, past medical history, past social history, past surgical history and problem list      Past Medical History:   Diagnosis Date    Arthritis     Degenerative joint disease of right hip     High cholesterol     treated with diet changes and fish oil    Hip pain        Past Surgical History:   Procedure Laterality Date    HYSTERECTOMY      TX COLONOSCOPY FLX DX W/COLLJ SPEC WHEN PFRMD N/A 2/3/2018    Procedure: COLONOSCOPY;  Surgeon: Eber Baumann MD;  Location: [facility]; Service: Gastroenterology    TX TOTAL HIP ARTHROPLASTY Right 4/18/2016    Procedure: TOTAL HIP ARTHROPLASTY ;  Surgeon: Daniele Floyd MD;  [facility];  Service: Orthopedics    SHOULDER SURGERY Right     reconstruction for chronic anterior dislocation    TONSILLECTOMY      with adenoidectomy    TUBAL LIGATION         Family History   Problem Relation Age of Onset    Hypertension Mother     Alzheimer's disease Mother     COPD Father     Pulmonary fibrosis Father     Asthma Sister          Medications have been verified  Objective   /92   Pulse 66   Temp 97 9 °F (36 6 °C) (Temporal)   Resp 16   Ht 5' 6" (1 676 m)   Wt 79 4 kg (175 lb)   LMP  (LMP Unknown)   SpO2 97%   BMI 28 25 kg/m²        Physical Exam     Physical Exam   Constitutional: Vital signs are normal  She appears well-developed  No distress  Cardiovascular: Normal rate, regular rhythm, normal heart sounds and intact distal pulses     Pulmonary/Chest: Effort normal and breath sounds normal    Skin:

## 2019-11-03 ENCOUNTER — ANESTHESIA EVENT (OUTPATIENT)
Dept: GASTROENTEROLOGY | Facility: HOSPITAL | Age: 64
End: 2019-11-03

## 2019-11-04 ENCOUNTER — ANESTHESIA (OUTPATIENT)
Dept: GASTROENTEROLOGY | Facility: HOSPITAL | Age: 64
End: 2019-11-04

## 2019-11-04 ENCOUNTER — HOSPITAL ENCOUNTER (OUTPATIENT)
Dept: GASTROENTEROLOGY | Facility: HOSPITAL | Age: 64
Setting detail: OUTPATIENT SURGERY
Discharge: HOME | End: 2019-11-04
Attending: INTERNAL MEDICINE
Payer: COMMERCIAL

## 2019-11-04 VITALS
RESPIRATION RATE: 15 BRPM | OXYGEN SATURATION: 96 % | DIASTOLIC BLOOD PRESSURE: 73 MMHG | WEIGHT: 172.4 LBS | BODY MASS INDEX: 27.71 KG/M2 | TEMPERATURE: 97.3 F | HEIGHT: 66 IN | HEART RATE: 73 BPM | SYSTOLIC BLOOD PRESSURE: 125 MMHG

## 2019-11-04 DIAGNOSIS — Z86.010 HISTORY OF COLON POLYPS: ICD-10-CM

## 2019-11-04 PROCEDURE — 88305 TISSUE EXAM BY PATHOLOGIST: CPT | Performed by: PATHOLOGY

## 2019-11-04 PROCEDURE — 45385 COLONOSCOPY W/LESION REMOVAL: CPT | Performed by: INTERNAL MEDICINE

## 2019-11-04 RX ORDER — LIDOCAINE HYDROCHLORIDE 10 MG/ML
0.5 INJECTION, SOLUTION EPIDURAL; INFILTRATION; INTRACAUDAL; PERINEURAL ONCE AS NEEDED
Status: DISCONTINUED | OUTPATIENT
Start: 2019-11-04 | End: 2019-11-08 | Stop reason: HOSPADM

## 2019-11-04 RX ORDER — PROPOFOL 10 MG/ML
INJECTION, EMULSION INTRAVENOUS AS NEEDED
Status: DISCONTINUED | OUTPATIENT
Start: 2019-11-04 | End: 2019-11-04 | Stop reason: SURG

## 2019-11-04 RX ORDER — SODIUM CHLORIDE, SODIUM LACTATE, POTASSIUM CHLORIDE, CALCIUM CHLORIDE 600; 310; 30; 20 MG/100ML; MG/100ML; MG/100ML; MG/100ML
125 INJECTION, SOLUTION INTRAVENOUS CONTINUOUS
Status: DISCONTINUED | OUTPATIENT
Start: 2019-11-04 | End: 2019-11-08 | Stop reason: HOSPADM

## 2019-11-04 RX ORDER — LIDOCAINE HYDROCHLORIDE 10 MG/ML
INJECTION, SOLUTION INFILTRATION; PERINEURAL AS NEEDED
Status: DISCONTINUED | OUTPATIENT
Start: 2019-11-04 | End: 2019-11-04 | Stop reason: SURG

## 2019-11-04 RX ORDER — SODIUM CHLORIDE, SODIUM LACTATE, POTASSIUM CHLORIDE, CALCIUM CHLORIDE 600; 310; 30; 20 MG/100ML; MG/100ML; MG/100ML; MG/100ML
INJECTION, SOLUTION INTRAVENOUS CONTINUOUS PRN
Status: DISCONTINUED | OUTPATIENT
Start: 2019-11-04 | End: 2019-11-04 | Stop reason: SURG

## 2019-11-04 RX ADMIN — SODIUM CHLORIDE, SODIUM LACTATE, POTASSIUM CHLORIDE, AND CALCIUM CHLORIDE: .6; .31; .03; .02 INJECTION, SOLUTION INTRAVENOUS at 08:57

## 2019-11-04 RX ADMIN — LIDOCAINE HYDROCHLORIDE 50 MG: 10 INJECTION, SOLUTION INFILTRATION; PERINEURAL at 09:22

## 2019-11-04 RX ADMIN — PROPOFOL 50 MG: 10 INJECTION, EMULSION INTRAVENOUS at 09:32

## 2019-11-04 RX ADMIN — PROPOFOL 50 MG: 10 INJECTION, EMULSION INTRAVENOUS at 09:34

## 2019-11-04 RX ADMIN — PROPOFOL 50 MG: 10 INJECTION, EMULSION INTRAVENOUS at 09:30

## 2019-11-04 RX ADMIN — PROPOFOL 50 MG: 10 INJECTION, EMULSION INTRAVENOUS at 09:24

## 2019-11-04 RX ADMIN — SODIUM CHLORIDE, SODIUM LACTATE, POTASSIUM CHLORIDE, AND CALCIUM CHLORIDE 125 ML/HR: .6; .31; .03; .02 INJECTION, SOLUTION INTRAVENOUS at 08:56

## 2019-11-04 RX ADMIN — PROPOFOL 50 MG: 10 INJECTION, EMULSION INTRAVENOUS at 09:26

## 2019-11-04 RX ADMIN — PROPOFOL 100 MG: 10 INJECTION, EMULSION INTRAVENOUS at 09:22

## 2019-11-04 RX ADMIN — PROPOFOL 50 MG: 10 INJECTION, EMULSION INTRAVENOUS at 09:23

## 2019-11-04 RX ADMIN — PROPOFOL 50 MG: 10 INJECTION, EMULSION INTRAVENOUS at 09:25

## 2019-11-04 RX ADMIN — PROPOFOL 50 MG: 10 INJECTION, EMULSION INTRAVENOUS at 09:28

## 2019-11-04 NOTE — ANESTHESIA PREPROCEDURE EVALUATION
Review of Systems/Medical History  Patient summary reviewed  Chart reviewed  No history of anesthetic complications     Cardiovascular  Negative cardio ROS Hyperlipidemia,    Pulmonary  Negative pulmonary ROS Asthma (prescribed inhaler for acute bronchitis) ,        GI/Hepatic  Negative GI/hepatic ROS          Negative  ROS        Endo/Other  Negative endo/other ROS      GYN  Negative gynecology ROS   Hysterectomy,        Hematology  Negative hematology ROS      Musculoskeletal  Negative musculoskeletal ROS   Arthritis     Neurology  Negative neurology ROS      Psychology   Negative psychology ROS          Prev anes - IV sedations (colonoscopy and LESLIE with spinal)    EKG 4/2016: NSR, LAE    Family medicine office visit 9/3/19:  Assessment  1  Health maintenance-due for mammogram DEXA scan and blood work  Colonoscopy due (needs 1 year f/u from 2018) Updated immunizations  Given Prevnar today  2   Cough variant asthma-improved with Flovent which she is now discontinued  Advised pneumonia shots  3   Stress-discussed  Plan  Check blood work, mammogram, DEXA scan  Prevnar today  Recheck in gyn exam 6 months  No results found for this or any previous visit (from the past 1008 hour(s))  Physical Exam    Airway    Mallampati score: II  TM Distance: >3 FB  Neck ROM: full     Dental   No notable dental hx     Cardiovascular  Comment: Negative ROS, Cardiovascular exam normal    Pulmonary  Pulmonary exam normal     Other Findings        Anesthesia Plan  ASA Score- 2     Anesthesia Type- IV sedation with anesthesia with ASA Monitors  Additional Monitors:   Airway Plan:         Plan Factors-    Induction-     Postoperative Plan-     Informed Consent- Anesthetic plan and risks discussed with patient  I personally reviewed this patient with the CRNA  Discussed and agreed on the Anesthesia Plan with the CRNA  Kristina Tenorio

## 2019-11-04 NOTE — H&P
History and Physical - [facility]  Christiana Herbert 58 y o  female MRN: [id number]                  HPI: Christiana Herbert is a 58y o  year old female who presents for colonoscopy after piecemeal polypectomy of large colon polyps in 2018      REVIEW OF SYSTEMS: Per the HPI, and otherwise unremarkable  Historical Information   Past Medical History:   Diagnosis Date    Arthritis     Colon polyp     Degenerative joint disease of right hip     High cholesterol     treated with diet changes and fish oil    Hip pain      Past Surgical History:   Procedure Laterality Date    COLONOSCOPY      HYSTERECTOMY      [place] COLONOSCOPY FLX DX W/COLLJ SPEC WHEN PFRMD N/A 2/3/2018    Procedure: COLONOSCOPY;  Surgeon: Rohini Hunt MD;  Location[facility]; Service: Gastroenterology    [place] TOTAL HIP ARTHROPLASTY Right 4/18/2016    Procedure: TOTAL HIP ARTHROPLASTY ;  Surgeon: Army Silverio MD;  [facility];  Service: Orthopedics    SHOULDER SURGERY Right     reconstruction for chronic anterior dislocation    TONSILLECTOMY      with adenoidectomy    TUBAL LIGATION       Social History   Social History     Substance and Sexual Activity   Alcohol Use Yes    Comment: social     Social History     Substance and Sexual Activity   Drug Use No     Social History     Tobacco Use   Smoking Status Never Smoker   Smokeless Tobacco Never Used   Tobacco Comment    SECOND HAND SMOKE     Family History   Problem Relation Age of Onset    Hypertension Mother     Alzheimer's disease Mother    [facility] COPD Father     Pulmonary fibrosis Father     Asthma Sister        Meds/Allergies       (Not in a hospital admission)    Allergies   Allergen Reactions    Tramadol GI Intolerance       Objective     Blood pressure 162/70, pulse 65, temperature 97 5 °F (36 4 °C), temperature source Oral, resp  rate 12, height 5' 6" (1 676 m), weight 78 2 kg (172 lb 6 4 oz), SpO2 98 %        PHYSICAL EXAM    /70   Pulse 65   Temp 97 5 °F (36 4 °C) (Oral)   Resp 12   Ht 5' 6" (1 676 m)   Wt 78 2 kg (172 lb 6 4 oz)   LMP  (LMP Unknown)   SpO2 98%   BMI 27 83 kg/m²       Gen: NAD  CV: RRR  CHEST: Clear  ABD: soft, NT/ND  EXT: no edema      ASSESSMENT/PLAN:  This is a 58y o  year old female here for colonoscopy, and she is stable and optimized for her procedure

## 2019-11-04 NOTE — DISCHARGE INSTRUCTIONS
Colonoscopy   WHAT YOU NEED TO KNOW:   A colonoscopy is a procedure to examine the inside of your colon (intestine) with a scope  Polyps or tissue growths may have been removed during your colonoscopy  It is normal to feel bloated and to have some abdominal discomfort  You should be passing gas  If you have hemorrhoids or you had polyps removed, you may have a small amount of bleeding  DISCHARGE INSTRUCTIONS:   Seek care immediately if:   · You have a large amount of bright red blood in your bowel movements  · Your abdomen is hard and firm and you have severe pain  · You have sudden trouble breathing  Contact your healthcare provider if:   · You develop a rash or hives  · You have a fever within 24 hours of your procedure  · You have not had a bowel movement for 3 days after your procedure  · You have questions or concerns about your condition or care  Activity:   · Do not lift, strain, or run  for 3 days after your procedure  · Rest after your procedure  You have been given medicine to relax you  Do not  drive or make important decisions until the day after your procedure  Return to your normal activity as directed  · Relieve gas and discomfort from bloating  by lying on your right side with a heating pad on your abdomen  You may need to take short walks to help the gas move out  Eat small meals until bloating is relieved  If you had polyps removed: For 7 days after your procedure:  · Do not  take aspirin  · Do not  go on long car rides  Help prevent constipation:   · Eat a variety of healthy foods  Healthy foods include fruit, vegetables, whole-grain breads, low-fat dairy products, beans, lean meat, and fish  Ask if you need to be on a special diet  Your healthcare provider may recommend that you eat high-fiber foods such as cooked beans  Fiber helps you have regular bowel movements  · Drink liquids as directed    Adults should drink between 9 and 13 eight-ounce cups of liquid every day  Ask what amount is best for you  For most people, good liquids to drink are water, juice, and milk  · Exercise as directed  Talk to your healthcare provider about the best exercise plan for you  Exercise can help prevent constipation, decrease your blood pressure and improve your health  Follow up with your healthcare provider as directed:  Write down your questions so you remember to ask them during your visits  © 2017 [employer] Information is for End User's use only and may not be sold, redistributed or otherwise used for commercial purposes  All illustrations and images included in CareNotes® are the copyrighted property of A D [employer]  or Dorian Juan  The above information is an  only  It is not intended as medical advice for individual conditions or treatments  Talk to your doctor, nurse or pharmacist before following any medical regimen to see if it is safe and effective for you  Diverticulosis   WHAT YOU NEED TO KNOW:   Diverticulosis is a condition that causes small pockets called diverticula to form in your intestine  These pockets make it difficult for bowel movements to pass through your digestive system  DISCHARGE INSTRUCTIONS:   Seek care immediately if:   · You have severe pain on the left side of your lower abdomen  · Your bowel movements are bright or dark red  Contact your healthcare provider if:   · You have a fever and chills  · You feel dizzy or lightheaded  · You have nausea, or you are vomiting  · You have a change in your bowel movements  · You have questions or concerns about your condition or care  Medicines:   · Medicines  to soften your bowel movements may be given  You may also need medicines to treat symptoms such as bloating and pain  · Take your medicine as directed  Contact your healthcare provider if you think your medicine is not helping or if you have side effects   Tell him or her if you are allergic to any medicine  Keep a list of the medicines, vitamins, and herbs you take  Include the amounts, and when and why you take them  Bring the list or the pill bottles to follow-up visits  Carry your medicine list with you in case of an emergency  Self-care: The goal of treatment is to manage any symptoms you have and prevent other problems such as diverticulitis  Diverticulitis is swelling or infection of the diverticula  Your healthcare provider may recommend any of the following:  · Eat a variety of high-fiber foods  High-fiber foods help you have regular bowel movements  High-fiber foods include cooked beans, fruits, vegetables, and some cereals  Most adults need 25 to 35 grams of fiber each day  Your healthcare provider may recommend that you have more  Ask your healthcare provider how much fiber you need  Increase fiber slowly  You may have abdominal discomfort, bloating, and gas if you add fiber to your diet too quickly  You may need to take a fiber supplement if you are not getting enough fiber from food  · Drink liquids as directed  You may need to drink 2 to 3 liters (8 to 12 cups) of liquids every day  Ask your healthcare provider how much liquid to drink each day and which liquids are best for you  · Apply heat  on your abdomen for 20 to 30 minutes every 2 hours for as many days as directed  Heat helps decrease pain and muscle spasms  Help prevent diverticulitis or other symptoms: The following may help decrease your risk for diverticulitis or symptoms, such as bleeding  Talk to your provider about these or other things you can do to prevent problems that may occur with diverticulosis  · Exercise regularly  Ask your healthcare provider about the best exercise plan for you  Exercise can help you have regular bowel movements  Get 30 minutes of exercise on most days of the week  · Maintain a healthy weight    Ask your healthcare provider how much you should weigh  Ask him or her to help you create a weight loss plan if you are overweight  · Do not smoke  Nicotine and other chemicals in cigarettes increase your risk for diverticulitis  Ask your healthcare provider for information if you currently smoke and need help to quit  E-cigarettes or smokeless tobacco still contain nicotine  Talk to your healthcare provider before you use these products  · Ask your healthcare provider if it is safe to take NSAIDs  NSAIDs may increase your risk of diverticulitis  Follow up with your healthcare provider as directed:  Write down your questions so you remember to ask them during your visits  © 2017 [address] [facility] Information is for End User's use only and may not be sold, redistributed or otherwise used for commercial purposes  All illustrations and images included in CareNotes® are the copyrighted property of [employer]  or Dorian Juan  The above information is an  only  It is not intended as medical advice for individual conditions or treatments  Talk to your doctor, nurse or pharmacist before following any medical regimen to see if it is safe and effective for you

## 2019-11-04 NOTE — ANESTHESIA POSTPROCEDURE EVALUATION
Post-Op Assessment Note    CV Status:  Stable  Pain Score: 0    Pain management: adequate     Mental Status:  Awake   Hydration Status:  Stable   PONV Controlled:  Controlled   Airway Patency:  Patent   Post Op Vitals Reviewed: Yes      Staff: CRNA           BP   137/63   Temp      Pulse  85   Resp   15   SpO2   97

## 2019-11-11 ENCOUNTER — TELEPHONE (OUTPATIENT)
Dept: GASTROENTEROLOGY | Facility: CLINIC | Age: 64
End: 2019-11-11

## 2019-11-11 NOTE — TELEPHONE ENCOUNTER
----- Message from Valentina Flores MD sent at 11/11/2019 11:37 AM EST -----  Please tell her these polyps were precancerous and to come back in 3 year

## 2019-11-22 DIAGNOSIS — Z12.31 VISIT FOR SCREENING MAMMOGRAM: ICD-10-CM

## 2019-11-25 DIAGNOSIS — Z13.820 SCREENING FOR OSTEOPOROSIS: ICD-10-CM

## 2019-11-29 ENCOUNTER — TELEPHONE (OUTPATIENT)
Dept: FAMILY MEDICINE CLINIC | Facility: MEDICAL CENTER | Age: 64
End: 2019-11-29

## 2019-11-29 NOTE — TELEPHONE ENCOUNTER
----- Message from Jessie Mosquera MD sent at 11/28/2019 10:53 PM EST -----  Notify DEXA scan is normal   Repeat 2 years

## 2020-02-03 ENCOUNTER — OFFICE VISIT (OUTPATIENT)
Dept: PULMONOLOGY | Facility: CLINIC | Age: 65
End: 2020-02-03
Payer: COMMERCIAL

## 2020-02-03 VITALS
WEIGHT: 174 LBS | DIASTOLIC BLOOD PRESSURE: 74 MMHG | SYSTOLIC BLOOD PRESSURE: 110 MMHG | OXYGEN SATURATION: 93 % | BODY MASS INDEX: 27.97 KG/M2 | HEIGHT: 66 IN | HEART RATE: 77 BPM

## 2020-02-03 DIAGNOSIS — J30.89 NON-SEASONAL ALLERGIC RHINITIS DUE TO OTHER ALLERGIC TRIGGER: ICD-10-CM

## 2020-02-03 DIAGNOSIS — Z91.09 ENVIRONMENTAL ALLERGIES: ICD-10-CM

## 2020-02-03 DIAGNOSIS — J45.20 MILD INTERMITTENT REACTIVE AIRWAY DISEASE WITHOUT COMPLICATION: Primary | ICD-10-CM

## 2020-02-03 DIAGNOSIS — R06.83 SNORING: ICD-10-CM

## 2020-02-03 PROCEDURE — 3008F BODY MASS INDEX DOCD: CPT | Performed by: PHYSICIAN ASSISTANT

## 2020-02-03 PROCEDURE — 4040F PNEUMOC VAC/ADMIN/RCVD: CPT | Performed by: PHYSICIAN ASSISTANT

## 2020-02-03 PROCEDURE — 1036F TOBACCO NON-USER: CPT | Performed by: PHYSICIAN ASSISTANT

## 2020-02-03 PROCEDURE — 99214 OFFICE O/P EST MOD 30 MIN: CPT | Performed by: PHYSICIAN ASSISTANT

## 2020-02-03 RX ORDER — FLUTICASONE PROPIONATE 50 MCG
1 SPRAY, SUSPENSION (ML) NASAL DAILY
Qty: 1 BOTTLE | Refills: 3 | Status: SHIPPED | OUTPATIENT
Start: 2020-02-03

## 2020-02-03 NOTE — PROGRESS NOTES
Assessment:    1  Mild intermittent reactive airway disease without complication     2  Environmental allergies     3  Non-seasonal allergic rhinitis due to other allergic trigger  fluticasone (FLONASE) 50 mcg/act nasal spray   4  Snoring           Plan:   Patient presents today for follow-up, overall doing well  Her breathing remains stable  Again reviewed [facility] allergy panel demonstrating allergy to cockroach and white as tree  Hypersensitivity pneumonitis profile was negative  Spirometry has been normal in the past   She occasionally uses the Flovent  Very rarely requires the Ventolin or the nebulizer  Bothersome symptoms consistent with allergic rhinitis and postnasal drip  Recommend starting with Flonase 1 spray in each nostril daily  She also describes some signs and symptoms consistent with sleep apnea including snoring, multiple nocturnal awakenings, and excessive daytime sleepiness  Discussed with her given the symptoms and risk factors for sleep apnea, would recommend a diagnostic sleep study  She is currently refusing, would like to think about it  I did make her aware of consequences of untreated sleep apnea including increased risk for cardiac disease, CVA, etc    All of the patient's questions were answered to their satisfaction and understanding, which was verbalized  Patient was advised to call the office prior to next appointment should they have any questions or concerns prior  Patient made aware of worrisome symptoms that should prompt a visit to the ER or call to 911 such as chest pain, severe shortness of breath, cyanosis, throat closing, syncope, etc     Subjective:     Patient ID: Kushal Preston is a 71 y o  female  Chief Complaint:  Charly Montes is a very pleasant 79-year-old female never smoker with past medical history including but not limited to mild intermittent reactive airway disease, environmental allergies, allergic rhinitis, arthritis, hyperlipidemia    She presents today for follow follow-up  Patient reports that her breathing has remained stable  She did have 1 upper respiratory infection this past December, at that time required the Ventolin and nebulizer  Otherwise, her breathing has been doing very well  She denies shortness of breath on a regular basis  She does have nasal congestion and postnasal drip associated with a dry cough  There is occasionally wheezing  She does not use any allergy medications or nasal sprays  She works at a nursing home  She also admits to some signs and symptoms consistent with sleep apnea including snoring, multiple nocturnal disturbances, and excessive daytime sleepiness  The following portions of the patient's history were reviewed in this encounter and updated as appropriate: Past medical, social, surgical, family, allergies    Review of Systems   Constitutional: Positive for fatigue  HENT: Positive for congestion and postnasal drip  Respiratory: Positive for choking and wheezing  Negative for shortness of breath  Cardiovascular: Negative for chest pain and leg swelling  Allergic/Immunologic: Positive for environmental allergies  Neurological: Negative for dizziness, weakness and light-headedness  Psychiatric/Behavioral: Positive for sleep disturbance  All other systems reviewed and are negative  Objective:    Physical Exam   Constitutional: She is oriented to person, place, and time  She appears well-developed and well-nourished  No distress  HENT:   Head: Normocephalic and atraumatic  Right Ear: External ear normal    Left Ear: External ear normal    Nose: Nose normal    Mouth/Throat: Oropharynx is clear and moist  No oropharyngeal exudate  Short and wide neck  Erythematous oropharynx with postnasal drip  No exudates  Eyes: Conjunctivae and EOM are normal  Right eye exhibits no discharge  Left eye exhibits no discharge  No scleral icterus  Neck: Normal range of motion  Neck supple   No tracheal deviation present  Cardiovascular: Normal rate, regular rhythm and normal heart sounds  Exam reveals no gallop and no friction rub  No murmur heard  Pulmonary/Chest: Effort normal and breath sounds normal  No stridor  No respiratory distress  She has no wheezes  She has no rales  Abdominal: She exhibits no distension  There is no guarding  Musculoskeletal: Normal range of motion  She exhibits no edema, tenderness or deformity  Neurological: She is alert and oriented to person, place, and time  No cranial nerve deficit  Skin: Skin is warm and dry  No rash noted  She is not diaphoretic  No erythema  No pallor  Psychiatric: She has a normal mood and affect  Her behavior is normal  Judgment and thought content normal    Nursing note and vitals reviewed  Lab Review:   No visits with results within 2 Month(s) from this visit  Latest known visit with results is:   Hospital Outpatient Visit on 11/04/2019   Component Date Value    Case Report 11/04/2019                      Value:Surgical Pathology Report                         Case: [id number]                                   Authorizing Provider:  Joseph Dewey MD   Collected:           11/04/2019 0936              Ordering Location:      [facility]       Received:            11/04/2019 [address] [place] [address] Endoscopy                                                             Pathologist:           Ishmael Almanzar MD                                                               Specimen:    Polyp, Colorectal, hepatic flexure                                                         Final Diagnosis 11/04/2019                      Value: This result contains rich text formatting which cannot be displayed here   Additional Information 11/04/2019                      Value: This result contains rich text formatting which cannot be displayed here      Synoptic Checklist 11/04/2019                      Value: (COLON/RECTUM POLYP FORM-GI - All Specimens)                                                                                                                 : Adenoma(s)     Antonio Marmolejo Description 11/04/2019                      Value: This result contains rich text formatting which cannot be displayed here      Clinical Information 11/04/2019                      Value:Cold snare polypectomy

## 2020-05-19 ENCOUNTER — OFFICE VISIT (OUTPATIENT)
Dept: FAMILY MEDICINE CLINIC | Facility: MEDICAL CENTER | Age: 65
End: 2020-05-19
Payer: COMMERCIAL

## 2020-05-19 VITALS
HEIGHT: 66 IN | WEIGHT: 175.13 LBS | SYSTOLIC BLOOD PRESSURE: 134 MMHG | HEART RATE: 88 BPM | DIASTOLIC BLOOD PRESSURE: 82 MMHG | RESPIRATION RATE: 14 BRPM | BODY MASS INDEX: 28.15 KG/M2 | TEMPERATURE: 98.9 F | OXYGEN SATURATION: 97 %
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
## 2020-05-19 PROCEDURE — 1036F TOBACCO NON-USER: CPT | Performed by: FAMILY MEDICINE

## 2020-05-19 PROCEDURE — 3288F FALL RISK ASSESSMENT DOCD: CPT | Performed by: FAMILY MEDICINE

## 2020-05-19 PROCEDURE — 1101F PT FALLS ASSESS-DOCD LE1/YR: CPT | Performed by: FAMILY MEDICINE

## 2020-05-19 PROCEDURE — 3008F BODY MASS INDEX DOCD: CPT | Performed by: FAMILY MEDICINE

## 2020-05-19 PROCEDURE — 99213 OFFICE O/P EST LOW 20 MIN: CPT | Performed by: FAMILY MEDICINE

## 2020-05-19 PROCEDURE — 4040F PNEUMOC VAC/ADMIN/RCVD: CPT | Performed by: FAMILY MEDICINE

## 2020-05-19 PROCEDURE — 1160F RVW MEDS BY RX/DR IN RCRD: CPT | Performed by: FAMILY MEDICINE

## 2020-05-19 RX ORDER — DOXYCYCLINE 100 MG/1
200 TABLET ORAL DAILY
Qty: 2 TABLET | Refills: 0 | Status: SHIPPED | OUTPATIENT
Start: 2020-05-19 | End: 2020-05-20

## 2021-01-11 ENCOUNTER — TELEPHONE (OUTPATIENT)
Dept: FAMILY MEDICINE CLINIC | Facility: MEDICAL CENTER | Age: 66
End: 2021-01-11

## 2021-01-11 DIAGNOSIS — Z12.31 OTHER SCREENING MAMMOGRAM: Primary | ICD-10-CM

## 2021-01-11 NOTE — TELEPHONE ENCOUNTER
Pt has mammo appt for this morning at [facility], can we fax, pt calling back with fax #

## 2021-01-13 DIAGNOSIS — Z12.31 OTHER SCREENING MAMMOGRAM: ICD-10-CM

## 2021-01-14 ENCOUNTER — TELEPHONE (OUTPATIENT)
Dept: FAMILY MEDICINE CLINIC | Facility: MEDICAL CENTER | Age: 66
End: 2021-01-14

## 2021-01-14 NOTE — TELEPHONE ENCOUNTER
----- Message from Leopoldo Tomas MD sent at 1/14/2021 10:04 AM EST -----  Notify mammogram normal   Repeat 1 year

## 2022-02-14 ENCOUNTER — OFFICE VISIT (OUTPATIENT)
Dept: FAMILY MEDICINE CLINIC | Facility: MEDICAL CENTER | Age: 67
End: 2022-02-14
Payer: MEDICARE

## 2022-02-14 VITALS
BODY MASS INDEX: 27.57 KG/M2 | SYSTOLIC BLOOD PRESSURE: 144 MMHG | TEMPERATURE: 98.3 F | RESPIRATION RATE: 16 BRPM | DIASTOLIC BLOOD PRESSURE: 80 MMHG | WEIGHT: 170.8 LBS | HEART RATE: 65 BPM

## 2022-02-14 DIAGNOSIS — Z11.59 ENCOUNTER FOR HEPATITIS C SCREENING TEST FOR LOW RISK PATIENT: ICD-10-CM

## 2022-02-14 DIAGNOSIS — Z77.22 SECOND HAND SMOKE EXPOSURE: ICD-10-CM

## 2022-02-14 DIAGNOSIS — M79.642 PAIN IN BOTH HANDS: ICD-10-CM

## 2022-02-14 DIAGNOSIS — E78.5 HYPERLIPIDEMIA, UNSPECIFIED HYPERLIPIDEMIA TYPE: Primary | ICD-10-CM

## 2022-02-14 DIAGNOSIS — Z23 NEED FOR PNEUMOCOCCAL VACCINE: ICD-10-CM

## 2022-02-14 DIAGNOSIS — Z00.00 ROUTINE ADULT HEALTH MAINTENANCE: ICD-10-CM

## 2022-02-14 DIAGNOSIS — Z12.31 OTHER SCREENING MAMMOGRAM: ICD-10-CM

## 2022-02-14 DIAGNOSIS — M79.641 PAIN IN BOTH HANDS: ICD-10-CM

## 2022-02-14 DIAGNOSIS — Z78.0 POST-MENOPAUSAL: ICD-10-CM

## 2022-02-14 PROCEDURE — 99215 OFFICE O/P EST HI 40 MIN: CPT | Performed by: FAMILY MEDICINE

## 2022-02-14 PROCEDURE — G0009 ADMIN PNEUMOCOCCAL VACCINE: HCPCS | Performed by: FAMILY MEDICINE

## 2022-02-14 PROCEDURE — 90732 PPSV23 VACC 2 YRS+ SUBQ/IM: CPT | Performed by: FAMILY MEDICINE

## 2022-02-14 NOTE — PROGRESS NOTES
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; lives with      Colonoscopy 2018  3 year f/u   Dexa scan 2018 normal  Mammogram 2022  Eye checkup due  BW 2018    She complains of numbness in her hands alcides the right   Bad at night, while driving, flexing hands  Hurts with walking the dog  Strength ok  Started about a year ago  No history of cervical disc degeneration  She otherwise has no change in medical history  Denies any chest pain shortness of breath palpitations or dizzy spells  She still gets a cough approximately once a week  Gets a lot of mucus in her throat  See previous pulmonary evaluation with cough variant asthma  It is much improved over several years ago  She thinks that it may be related to the passive smoking from her   Does have some reflux  O: /80 (Cuff Size: Large)   Pulse 65   Temp 98 3 °F (36 8 °C)   Resp 16   Wt 77 5 kg (170 lb 12 8 oz)   LMP  (LMP Unknown)   BMI 27 57 kg/m²   Neck no adenopathy thyromegaly bruits  Chest is clear with good breath 1  Health maintenance sounds  Cardiac regular rate without murmur  Extremities positive Tinel sign on the right    Assessment  1  Health maintenance-up-to-date with breast, cervical, and colorectal cancer screening  Due for colonoscopy this year  Discussed osteoporosis prevention with adequate dietary calcium and weight-bearing exercise  Avoid bone robbers  Immunizations updated  Up-to-date with COVID vaccine and booster  Should have Tdap and Shingrix and Pneumovax  She is due for routine gyn exam   2  Bilateral hand paresthesias-suspect bilateral; carpal tunnel syndrome will refer orthopedics  3   Intermittent cough-previously diagnosed with cough variant asthma  This is significantly better and she does not really use the Flovent inhaler anymore  Could also consider reflux  4  Mildly elevated blood pressure on visit today-will reassess  5  Hyperlipidemia-due for blood work    Plan  Pneumovax  Check blood work  Mammogram and DEXA scan next year    Ortho referral   Revisit for gyn exam review blood work and reassessment of blood pressure

## 2022-03-07 ENCOUNTER — APPOINTMENT (OUTPATIENT)
Dept: LAB | Facility: CLINIC | Age: 67
End: 2022-03-07
Payer: MEDICARE

## 2022-03-07 DIAGNOSIS — E78.5 HYPERLIPIDEMIA, UNSPECIFIED HYPERLIPIDEMIA TYPE: ICD-10-CM

## 2022-03-07 DIAGNOSIS — Z11.59 ENCOUNTER FOR HEPATITIS C SCREENING TEST FOR LOW RISK PATIENT: ICD-10-CM

## 2022-03-07 LAB
ALBUMIN SERPL BCP-MCNC: 3.8 G/DL (ref 3.5–5)
ALP SERPL-CCNC: 82 U/L (ref 46–116)
ALT SERPL W P-5'-P-CCNC: 27 U/L (ref 12–78)
ANION GAP SERPL CALCULATED.3IONS-SCNC: 3 MMOL/L (ref 4–13)
AST SERPL W P-5'-P-CCNC: 14 U/L (ref 5–45)
BASOPHILS # BLD AUTO: 0.07 THOUSANDS/ΜL (ref 0–0.1)
BASOPHILS NFR BLD AUTO: 1 % (ref 0–1)
BILIRUB SERPL-MCNC: 0.56 MG/DL (ref 0.2–1)
BUN SERPL-MCNC: 15 MG/DL (ref 5–25)
CALCIUM SERPL-MCNC: 8.9 MG/DL (ref 8.3–10.1)
CHLORIDE SERPL-SCNC: 107 MMOL/L (ref 100–108)
CHOLEST SERPL-MCNC: 230 MG/DL
CO2 SERPL-SCNC: 29 MMOL/L (ref 21–32)
CREAT SERPL-MCNC: 0.83 MG/DL (ref 0.6–1.3)
EOSINOPHIL # BLD AUTO: 0.15 THOUSAND/ΜL (ref 0–0.61)
EOSINOPHIL NFR BLD AUTO: 3 % (ref 0–6)
ERYTHROCYTE [DISTWIDTH] IN BLOOD BY AUTOMATED COUNT: 12.7 % (ref 11.6–15.1)
GFR SERPL CREATININE-BSD FRML MDRD: 73 ML/MIN/1.73SQ M
GLUCOSE P FAST SERPL-MCNC: 94 MG/DL (ref 65–99)
HCT VFR BLD AUTO: 43.3 % (ref 34.8–46.1)
HCV AB SER QL: NORMAL
HDLC SERPL-MCNC: 41 MG/DL
HGB BLD-MCNC: 13.7 G/DL (ref 11.5–15.4)
IMM GRANULOCYTES # BLD AUTO: 0.01 THOUSAND/UL (ref 0–0.2)
IMM GRANULOCYTES NFR BLD AUTO: 0 % (ref 0–2)
LDLC SERPL CALC-MCNC: 162 MG/DL (ref 0–100)
LYMPHOCYTES # BLD AUTO: 2.09 THOUSANDS/ΜL (ref 0.6–4.47)
LYMPHOCYTES NFR BLD AUTO: 42 % (ref 14–44)
MCH RBC QN AUTO: 28.9 PG (ref 26.8–34.3)
MCHC RBC AUTO-ENTMCNC: 31.6 G/DL (ref 31.4–37.4)
MCV RBC AUTO: 91 FL (ref 82–98)
MONOCYTES # BLD AUTO: 0.46 THOUSAND/ΜL (ref 0.17–1.22)
MONOCYTES NFR BLD AUTO: 9 % (ref 4–12)
NEUTROPHILS # BLD AUTO: 2.21 THOUSANDS/ΜL (ref 1.85–7.62)
NEUTS SEG NFR BLD AUTO: 45 % (ref 43–75)
NONHDLC SERPL-MCNC: 189 MG/DL
NRBC BLD AUTO-RTO: 0 /100 WBCS
PLATELET # BLD AUTO: 303 THOUSANDS/UL (ref 149–390)
PMV BLD AUTO: 10.1 FL (ref 8.9–12.7)
POTASSIUM SERPL-SCNC: 3.9 MMOL/L (ref 3.5–5.3)
PROT SERPL-MCNC: 7.9 G/DL (ref 6.4–8.2)
RBC # BLD AUTO: 4.74 MILLION/UL (ref 3.81–5.12)
SODIUM SERPL-SCNC: 139 MMOL/L (ref 136–145)
TRIGL SERPL-MCNC: 136 MG/DL
TSH SERPL DL<=0.05 MIU/L-ACNC: 2.1 UIU/ML (ref 0.36–3.74)
WBC # BLD AUTO: 4.99 THOUSAND/UL (ref 4.31–10.16)

## 2022-03-07 PROCEDURE — 85025 COMPLETE CBC W/AUTO DIFF WBC: CPT

## 2022-03-07 PROCEDURE — 36415 COLL VENOUS BLD VENIPUNCTURE: CPT

## 2022-03-07 PROCEDURE — 80061 LIPID PANEL: CPT

## 2022-03-07 PROCEDURE — 80053 COMPREHEN METABOLIC PANEL: CPT

## 2022-03-07 PROCEDURE — 86803 HEPATITIS C AB TEST: CPT

## 2022-03-07 PROCEDURE — 84443 ASSAY THYROID STIM HORMONE: CPT

## 2022-03-28 ENCOUNTER — ANNUAL EXAM (OUTPATIENT)
Dept: FAMILY MEDICINE CLINIC | Facility: MEDICAL CENTER | Age: 67
End: 2022-03-28
Payer: MEDICARE

## 2022-03-28 VITALS
WEIGHT: 169.6 LBS | BODY MASS INDEX: 27.26 KG/M2 | OXYGEN SATURATION: 98 % | SYSTOLIC BLOOD PRESSURE: 138 MMHG | HEIGHT: 66 IN | TEMPERATURE: 98.1 F | HEART RATE: 60 BPM | DIASTOLIC BLOOD PRESSURE: 88 MMHG

## 2022-03-28 DIAGNOSIS — Z12.4 SCREENING FOR CERVICAL CANCER: ICD-10-CM

## 2022-03-28 DIAGNOSIS — R03.0 PREHYPERTENSION: ICD-10-CM

## 2022-03-28 DIAGNOSIS — E78.5 HYPERLIPIDEMIA, UNSPECIFIED HYPERLIPIDEMIA TYPE: ICD-10-CM

## 2022-03-28 DIAGNOSIS — Z01.419 ENCOUNTER FOR GYNECOLOGICAL EXAMINATION WITHOUT ABNORMAL FINDING: Primary | ICD-10-CM

## 2022-03-28 PROCEDURE — G0145 SCR C/V CYTO,THINLAYER,RESCR: HCPCS | Performed by: FAMILY MEDICINE

## 2022-03-28 PROCEDURE — 99214 OFFICE O/P EST MOD 30 MIN: CPT | Performed by: FAMILY MEDICINE

## 2022-03-28 PROCEDURE — G0101 CA SCREEN;PELVIC/BREAST EXAM: HCPCS | Performed by: FAMILY MEDICINE

## 2022-03-28 PROCEDURE — G0476 HPV COMBO ASSAY CA SCREEN: HCPCS | Performed by: FAMILY MEDICINE

## 2022-03-29 NOTE — PROGRESS NOTES
Wisam Castillo is here for Gyn  Exam  She is also here for follow-up of her blood work and blood pressure  She was seen here for her CPX  02/14/22  See those notes for HH/FH/SH  Gyn  No vaginal bleeding, discharge, pain, burning  Occasional urinary stress incontinence  No breast complaints  S/P Hysterectomy approximately 20 years ago    O: /88 (BP Location: Left arm, Patient Position: Sitting, Cuff Size: Adult)   Pulse 60   Temp 98 1 °F (36 7 °C)   Ht 5' 6" (1 676 m)   Wt 76 9 kg (169 lb 9 6 oz)   LMP  (LMP Unknown)   SpO2 98%   BMI 27 37 kg/m²    BP by me 138/78  Physical Exam  Constitutional:       General: She is not in acute distress  Appearance: She is well-developed  Neck:      Thyroid: No thyromegaly  Vascular: No carotid bruit  Cardiovascular:      Rate and Rhythm: Normal rate and regular rhythm  Heart sounds: Normal heart sounds  Pulmonary:      Effort: Pulmonary effort is normal       Breath sounds: Normal breath sounds  Abdominal:      General: Bowel sounds are normal       Palpations: Abdomen is soft  There is no hepatomegaly or mass  Tenderness: There is no abdominal tenderness  Lymphadenopathy:      Cervical: No cervical adenopathy  Breasts no masses or discharge  External genitalia normal  Vagina normal  Cervix normal no lesions  Uterus normal size shape and consistency  Adnexae non palpable  Rectal exam no masses stool  heme negative    Assessment  1  Routine gyn exam-S/P supracervical hysterectomy-  Reviewed Pap smear and breast screening guidelines  Pap done today due to cervix present  2  Stress incontinence-suggested he go exercise  3  Dyslipidemia-ASCVD risk is 9 3 percent  Statin would be indicated  Patient declines treatment at this time  Will continue monitor  4  Pre hypertension-advised weight loss of 5-10 pounds as well as regular exercise and may be able to avoid treatment  Reassess 6 months  Plan  Pap smear/HPV    Recheck 6 months

## 2022-03-30 ENCOUNTER — TELEPHONE (OUTPATIENT)
Dept: ADMINISTRATIVE | Facility: OTHER | Age: 67
End: 2022-03-30

## 2022-03-30 NOTE — TELEPHONE ENCOUNTER
----- Message from Ronna Sandoval sent at 3/29/2022  2:14 PM EDT -----  Regarding: Mammo  03/29/22 2:14 PM    Hello, our patient Ilda Hoover has had Mammogram completed/performed  Please assist in updating the patient chart by pulling the Care Everywhere (CE) document  The date of service is 2022       Thank you,  Ronna Sandoval  [facility] SEEAM

## 2022-03-31 LAB
HPV HR 12 DNA CVX QL NAA+PROBE: NEGATIVE
HPV16 DNA CVX QL NAA+PROBE: NEGATIVE
HPV18 DNA CVX QL NAA+PROBE: NEGATIVE

## 2022-03-31 NOTE — TELEPHONE ENCOUNTER
Upon review of the In Basket request we were able to locate, review, and update the patient chart as requested for Mammogram     Any additional questions or concerns should be emailed to the Practice Liaisons via [email or web address] email, please do not reply via In Basket      Thank you  Sean Lyons

## 2022-04-04 LAB
LAB AP GYN PRIMARY INTERPRETATION: NORMAL
Lab: NORMAL

## 2022-05-17 ENCOUNTER — OFFICE VISIT (OUTPATIENT)
Dept: OBGYN CLINIC | Facility: CLINIC | Age: 67
End: 2022-05-17
Payer: MEDICARE

## 2022-05-17 VITALS
HEIGHT: 67 IN | SYSTOLIC BLOOD PRESSURE: 127 MMHG | HEART RATE: 75 BPM | DIASTOLIC BLOOD PRESSURE: 86 MMHG | RESPIRATION RATE: 18 BRPM | BODY MASS INDEX: 26.37 KG/M2 | WEIGHT: 168 LBS

## 2022-05-17 DIAGNOSIS — M77.8 TENDINITIS OF BOTH WRISTS: ICD-10-CM

## 2022-05-17 DIAGNOSIS — G56.23 CUBITAL TUNNEL SYNDROME OF BOTH UPPER EXTREMITIES: ICD-10-CM

## 2022-05-17 DIAGNOSIS — G56.03 CARPAL TUNNEL SYNDROME ON BOTH SIDES: Primary | ICD-10-CM

## 2022-05-17 PROCEDURE — 99203 OFFICE O/P NEW LOW 30 MIN: CPT | Performed by: FAMILY MEDICINE

## 2022-05-17 RX ORDER — NAPROXEN 500 MG/1
500 TABLET ORAL 2 TIMES DAILY WITH MEALS
Qty: 60 TABLET | Refills: 0 | Status: SHIPPED | OUTPATIENT
Start: 2022-05-17

## 2022-05-17 NOTE — LETTER
May 17, 2022     Ji Bauer MD  [facility] [address]    Patient: Reid Mayes   YOB: 1955   Date of Visit: 5/17/2022       Dear Dr Grady Taveras: Thank you for referring Reid Mayes to me for evaluation  Below are my notes for this consultation  If you have questions, please do not hesitate to call me  I look forward to following your patient along with you  Sincerely,        [employer], DO        CC: No Recipients  [employer], DO  5/17/2022 10:56 AM  Sign when Signing Visit  Assessment/Plan:  Assessment/Plan   Diagnoses and all orders for this visit:    Carpal tunnel syndrome on both sides  -     naproxen (Naprosyn) 500 mg tablet; Take 1 tablet (500 mg total) by mouth in the morning and 1 tablet (500 mg total) in the evening  Take with meals   -     Cock Up Wrist Splint  -     Ambulatory Referral to Occupational Therapy; Future    Cubital tunnel syndrome of both upper extremities  -     naproxen (Naprosyn) 500 mg tablet; Take 1 tablet (500 mg total) by mouth in the morning and 1 tablet (500 mg total) in the evening  Take with meals  -     Ambulatory Referral to Occupational Therapy; Future    Tendinitis of both wrists  -     naproxen (Naprosyn) 500 mg tablet; Take 1 tablet (500 mg total) by mouth in the morning and 1 tablet (500 mg total) in the evening  Take with meals  -     Ambulatory Referral to Occupational Therapy; Future      58-year-old right-hand-dominant female with pain and numbness both hands more than 1 year duration  Discussed with patient physical exam, impression and plan  Physical exam unremarkable for bony or soft tissue tenderness of the wrist and hands  She demonstrates intact range of motion strength in wrist and digits of the hands  Cubital tunnel and median nerve Tinel's unremarkable  Phalen's is unremarkable  She is intact neurovascularly    Clinical impression is that she may be symptomatic combination of carpal tunnel and cubital tunnel syndrome  I discussed regimen of anti-inflammatory, splinting, supplements, and formal therapy  I provided with cock-up wrist splints to be worn at nighttime  She is to obtain thick neoprene sleeve to be worn over the elbows at night  She is to take naproxen 500 mg twice daily with food for 2 weeks  She is to start taking tumeric at least 1000 mg daily, tart cherry at least 1000 mg daily, and glucosamine-chondroitin 2 to 3 times a day  She may apply topical diclofenac gel 3 to 4 times a day for the next 10 days  She is to start formal therapy as soon as possible and do home exercises as directed  She will follow up as needed  Subjective:   Patient ID: Ce Ritter is a 78 y o  female  Chief Complaint   Patient presents with    Right Hand - Numbness, Pain, Tingling    Left Hand - Numbness, Pain, Tingling       34-year-old right-hand-dominant female presents for evaluation of pain and numbness both hands more than 1 year duration  She denies any particular trauma or inciting event  Pain described as gradual in onset, generalized to the hand but worse at the 1st through 3rd digits, achy and burning and throbbing, worse with activity particularly gripping and pinching, associated numbness and tingling that is usually worse with driving, worse with axial loading such as when getting up out of a chair, and improved resting or changing position  She sometimes takes ibuprofen to help with symptoms  She was seen by primary care provider and carpal tunnel suspected and she was referred to orthopedic care  Hand Pain  This is a chronic problem  The current episode started more than 1 year ago  The problem occurs daily  The problem has been gradually worsening  Associated symptoms include arthralgias, joint swelling and numbness  Pertinent negatives include no abdominal pain, chest pain, chills, fever, rash, sore throat or weakness  Exacerbated by: Gripping, pinching   She has tried rest and NSAIDs for the symptoms  The treatment provided mild relief  The following portions of the patient's history were reviewed and updated as appropriate: She  has a past medical history of Arthritis, Colon polyp, Degenerative joint disease of right hip, High cholesterol, and Hip pain  She  has a past surgical history that includes Tubal ligation; Hysterectomy; Tonsillectomy; Shoulder surgery (Right); pr total hip arthroplasty (Right, 4/18/2016); pr colonoscopy flx dx w/collj spec when pfrmd (N/A, 2/3/2018); Colonoscopy; and Joint replacement (04/16/2016)  Her family history includes Alzheimer's disease in her mother; Asthma in her sister; COPD in her father; Dementia in her mother; Hypertension in her mother; Pulmonary fibrosis in her father  She  reports that she has never smoked  She has never used smokeless tobacco  She reports current alcohol use  She reports that she does not use drugs  She is allergic to tramadol       Review of Systems   Constitutional: Negative for chills and fever  HENT: Negative for sore throat  Eyes: Negative for visual disturbance  Respiratory: Negative for shortness of breath  Cardiovascular: Negative for chest pain  Gastrointestinal: Negative for abdominal pain  Genitourinary: Negative for flank pain  Musculoskeletal: Positive for arthralgias and joint swelling  Skin: Negative for rash and wound  Neurological: Positive for numbness  Negative for weakness  Hematological: Does not bruise/bleed easily  Psychiatric/Behavioral: Negative for self-injury  Objective:  Vitals:    05/17/22 0936   BP: 127/86   Pulse: 75   Resp: 18   Weight: 76 2 kg (168 lb)   Height: 5' 7" (1 702 m)     Right Hand Exam     Tenderness   The patient is experiencing no tenderness  Range of Motion   The patient has normal right wrist ROM  Muscle Strength   The patient has normal right wrist strength      Tests   Phalens Sign: negative  Tinel's sign (median nerve): negative  Finkelstein's test: negative    Other   Sensation: normal  Pulse: present      Left Hand Exam     Tenderness   The patient is experiencing no tenderness  Range of Motion   The patient has normal left wrist ROM  Muscle Strength   The patient has normal left wrist strength  Tests   Phalens Sign: negative  Tinel's sign (median nerve): negative  Finkelstein's test: negative    Other   Sensation: normal  Pulse: present      Right Elbow Exam     Tests   Tinel's sign (cubital tunnel): negative      Left Elbow Exam     Tests   Tinel's sign (cubital tunnel): negative          Observations     Left Wrist/Hand   Negative for deformity  Right Wrist/Hand   Negative for deformity  Tenderness     Left Wrist/Hand   No tenderness in the first dorsal compartment, second dorsal compartment, sixth dorsal compartment, lateral epicondyle, medial epicondyle, triangular fibrocartilage complex , distal radioulnar joint, scaphoid and lunate  Right Wrist/Hand   No tenderness in the first dorsal compartment, second dorsal compartment, sixth dorsal compartment, lateral epicondyle, medial epicondyle, triangular fibrocartilage complex , distal radioulnar joint, scaphoid and lunate  Active Range of Motion     Left Wrist   Normal active range of motion    Right Wrist   Normal active range of motion    Strength/Myotome Testing     Left Wrist/Hand   Normal wrist strength     (2nd hand position)     Trial 1: 5    Right Wrist/Hand   Normal wrist strength     (2nd hand position)     Trial 1: 5    Tests     Left Wrist/Hand   Negative AIN OK sign, Finkelstein's, Phalen's sign, Tinel's sign (medial nerve) and Tinel's sign (radial tunnel)  Right Wrist/Hand   Negative AIN OK sign, Finkelstein's, Phalen's sign, Tinel's sign (medial nerve) and Tinel's sign (radial tunnel)  Physical Exam  Vitals and nursing note reviewed  Constitutional:       General: She is not in acute distress       Appearance: She is well-developed  She is not ill-appearing or diaphoretic  HENT:      Head: Normocephalic  Right Ear: External ear normal       Left Ear: External ear normal    Eyes:      Conjunctiva/sclera: Conjunctivae normal    Neck:      Trachea: No tracheal deviation  Cardiovascular:      Rate and Rhythm: Normal rate  Pulmonary:      Effort: Pulmonary effort is normal  No respiratory distress  Abdominal:      General: There is no distension  Musculoskeletal:         General: Swelling present  No tenderness, deformity or signs of injury  Right elbow: No medial epicondyle or lateral epicondyle tenderness  Left elbow: No medial epicondyle or lateral epicondyle tenderness  Right hand: No deformity  Left hand: No deformity  Skin:     General: Skin is warm and dry  Coloration: Skin is not jaundiced or pale  Neurological:      Mental Status: She is alert and oriented to person, place, and time  Psychiatric:         Mood and Affect: Mood normal          Behavior: Behavior normal          Thought Content:  Thought content normal          Judgment: Judgment normal

## 2022-05-17 NOTE — PROGRESS NOTES
Assessment/Plan:  Assessment/Plan   Diagnoses and all orders for this visit:    Carpal tunnel syndrome on both sides  -     naproxen (Naprosyn) 500 mg tablet; Take 1 tablet (500 mg total) by mouth in the morning and 1 tablet (500 mg total) in the evening  Take with meals   -     Cock Up Wrist Splint  -     Ambulatory Referral to Occupational Therapy; Future    Cubital tunnel syndrome of both upper extremities  -     naproxen (Naprosyn) 500 mg tablet; Take 1 tablet (500 mg total) by mouth in the morning and 1 tablet (500 mg total) in the evening  Take with meals  -     Ambulatory Referral to Occupational Therapy; Future    Tendinitis of both wrists  -     naproxen (Naprosyn) 500 mg tablet; Take 1 tablet (500 mg total) by mouth in the morning and 1 tablet (500 mg total) in the evening  Take with meals  -     Ambulatory Referral to Occupational Therapy; Future      25-year-old right-hand-dominant female with pain and numbness both hands more than 1 year duration  Discussed with patient physical exam, impression and plan  Physical exam unremarkable for bony or soft tissue tenderness of the wrist and hands  She demonstrates intact range of motion strength in wrist and digits of the hands  Cubital tunnel and median nerve Tinel's unremarkable  Phalen's is unremarkable  She is intact neurovascularly  Clinical impression is that she may be symptomatic combination of carpal tunnel and cubital tunnel syndrome  I discussed regimen of anti-inflammatory, splinting, supplements, and formal therapy  I provided with cock-up wrist splints to be worn at nighttime  She is to obtain thick neoprene sleeve to be worn over the elbows at night  She is to take naproxen 500 mg twice daily with food for 2 weeks  She is to start taking tumeric at least 1000 mg daily, tart cherry at least 1000 mg daily, and glucosamine-chondroitin 2 to 3 times a day  She may apply topical diclofenac gel 3 to 4 times a day for the next 10 days  She is to start formal therapy as soon as possible and do home exercises as directed  She will follow up as needed  Subjective:   Patient ID: Samy Loera is a 78 y o  female  Chief Complaint   Patient presents with    Right Hand - Numbness, Pain, Tingling    Left Hand - Numbness, Pain, Tingling       69-year-old right-hand-dominant female presents for evaluation of pain and numbness both hands more than 1 year duration  She denies any particular trauma or inciting event  Pain described as gradual in onset, generalized to the hand but worse at the 1st through 3rd digits, achy and burning and throbbing, worse with activity particularly gripping and pinching, associated numbness and tingling that is usually worse with driving, worse with axial loading such as when getting up out of a chair, and improved resting or changing position  She sometimes takes ibuprofen to help with symptoms  She was seen by primary care provider and carpal tunnel suspected and she was referred to orthopedic care  Hand Pain  This is a chronic problem  The current episode started more than 1 year ago  The problem occurs daily  The problem has been gradually worsening  Associated symptoms include arthralgias, joint swelling and numbness  Pertinent negatives include no abdominal pain, chest pain, chills, fever, rash, sore throat or weakness  Exacerbated by: Gripping, pinching  She has tried rest and NSAIDs for the symptoms  The treatment provided mild relief  The following portions of the patient's history were reviewed and updated as appropriate: She  has a past medical history of Arthritis, Colon polyp, Degenerative joint disease of right hip, High cholesterol, and Hip pain  She  has a past surgical history that includes Tubal ligation; Hysterectomy; Tonsillectomy; Shoulder surgery (Right); pr total hip arthroplasty (Right, 4/18/2016); pr colonoscopy flx dx w/collj spec when pfrmd (N/A, 2/3/2018);  Colonoscopy; and Joint replacement (04/16/2016)  Her family history includes Alzheimer's disease in her mother; Asthma in her sister; COPD in her father; Dementia in her mother; Hypertension in her mother; Pulmonary fibrosis in her father  She  reports that she has never smoked  She has never used smokeless tobacco  She reports current alcohol use  She reports that she does not use drugs  She is allergic to tramadol       Review of Systems   Constitutional: Negative for chills and fever  HENT: Negative for sore throat  Eyes: Negative for visual disturbance  Respiratory: Negative for shortness of breath  Cardiovascular: Negative for chest pain  Gastrointestinal: Negative for abdominal pain  Genitourinary: Negative for flank pain  Musculoskeletal: Positive for arthralgias and joint swelling  Skin: Negative for rash and wound  Neurological: Positive for numbness  Negative for weakness  Hematological: Does not bruise/bleed easily  Psychiatric/Behavioral: Negative for self-injury  Objective:  Vitals:    05/17/22 0936   BP: 127/86   Pulse: 75   Resp: 18   Weight: 76 2 kg (168 lb)   Height: 5' 7" (1 702 m)     Right Hand Exam     Tenderness   The patient is experiencing no tenderness  Range of Motion   The patient has normal right wrist ROM  Muscle Strength   The patient has normal right wrist strength  Tests   Phalens Sign: negative  Tinel's sign (median nerve): negative  Finkelstein's test: negative    Other   Sensation: normal  Pulse: present      Left Hand Exam     Tenderness   The patient is experiencing no tenderness  Range of Motion   The patient has normal left wrist ROM  Muscle Strength   The patient has normal left wrist strength      Tests   Phalens Sign: negative  Tinel's sign (median nerve): negative  Finkelstein's test: negative    Other   Sensation: normal  Pulse: present      Right Elbow Exam     Tests   Tinel's sign (cubital tunnel): negative      Left Elbow Exam     Tests Tinel's sign (cubital tunnel): negative          Observations     Left Wrist/Hand   Negative for deformity  Right Wrist/Hand   Negative for deformity  Tenderness     Left Wrist/Hand   No tenderness in the first dorsal compartment, second dorsal compartment, sixth dorsal compartment, lateral epicondyle, medial epicondyle, triangular fibrocartilage complex , distal radioulnar joint, scaphoid and lunate  Right Wrist/Hand   No tenderness in the first dorsal compartment, second dorsal compartment, sixth dorsal compartment, lateral epicondyle, medial epicondyle, triangular fibrocartilage complex , distal radioulnar joint, scaphoid and lunate  Active Range of Motion     Left Wrist   Normal active range of motion    Right Wrist   Normal active range of motion    Strength/Myotome Testing     Left Wrist/Hand   Normal wrist strength     (2nd hand position)     Trial 1: 5    Right Wrist/Hand   Normal wrist strength     (2nd hand position)     Trial 1: 5    Tests     Left Wrist/Hand   Negative AIN OK sign, Finkelstein's, Phalen's sign, Tinel's sign (medial nerve) and Tinel's sign (radial tunnel)  Right Wrist/Hand   Negative AIN OK sign, Finkelstein's, Phalen's sign, Tinel's sign (medial nerve) and Tinel's sign (radial tunnel)  Physical Exam  Vitals and nursing note reviewed  Constitutional:       General: She is not in acute distress  Appearance: She is well-developed  She is not ill-appearing or diaphoretic  HENT:      Head: Normocephalic  Right Ear: External ear normal       Left Ear: External ear normal    Eyes:      Conjunctiva/sclera: Conjunctivae normal    Neck:      Trachea: No tracheal deviation  Cardiovascular:      Rate and Rhythm: Normal rate  Pulmonary:      Effort: Pulmonary effort is normal  No respiratory distress  Abdominal:      General: There is no distension  Musculoskeletal:         General: Swelling present  No tenderness, deformity or signs of injury  Right elbow: No medial epicondyle or lateral epicondyle tenderness  Left elbow: No medial epicondyle or lateral epicondyle tenderness  Right hand: No deformity  Left hand: No deformity  Skin:     General: Skin is warm and dry  Coloration: Skin is not jaundiced or pale  Neurological:      Mental Status: She is alert and oriented to person, place, and time  Psychiatric:         Mood and Affect: Mood normal          Behavior: Behavior normal          Thought Content:  Thought content normal          Judgment: Judgment normal

## 2022-05-27 ENCOUNTER — EVALUATION (OUTPATIENT)
Dept: OCCUPATIONAL THERAPY | Facility: CLINIC | Age: 67
End: 2022-05-27
Payer: MEDICARE

## 2022-05-27 DIAGNOSIS — M77.8 TENDINITIS OF BOTH WRISTS: ICD-10-CM

## 2022-05-27 DIAGNOSIS — G56.23 CUBITAL TUNNEL SYNDROME OF BOTH UPPER EXTREMITIES: ICD-10-CM

## 2022-05-27 DIAGNOSIS — G56.03 CARPAL TUNNEL SYNDROME ON BOTH SIDES: ICD-10-CM

## 2022-05-27 PROCEDURE — 97110 THERAPEUTIC EXERCISES: CPT

## 2022-05-27 PROCEDURE — 97165 OT EVAL LOW COMPLEX 30 MIN: CPT

## 2022-05-27 NOTE — PROGRESS NOTES
OT Evaluation     Today's date: 2022  Patient name: Candice Fernandez  : 1955  MRN: [id number]  Referring provider: Jojo Neville DO  Dx:   Encounter Diagnosis     ICD-10-CM    1  Carpal tunnel syndrome on both sides  G56 03 Ambulatory Referral to Occupational Therapy   2  Cubital tunnel syndrome of both upper extremities  G56 23 Ambulatory Referral to Occupational Therapy   3  Tendinitis of both wrists  M77 8 Ambulatory Referral to Occupational Therapy                  Assessment  Assessment details: Candice Fernandez is a 78 y o , Right HD female referred to hand therapy for  bilateral hand numbness and thumb pain  Onset of symptoms over one year ago due to unknown etiology  Patient presents 21 with impaired ROM, strength, and sensation of the hands R>L  Deficits also noted in pain and functional use of the bilateral UE  Patient has provocative testing consistent with b/l CTS, cubital tunnel syndrome and right thumb CMC joint arthritis  Patient is a good candidate for OT services to abolish pain and edema and restore ROM, strength, coordination, and sensation for a return to independence in daily tasks      Impairments: abnormal coordination, abnormal or restricted ROM, activity intolerance, impaired physical strength, lacks appropriate home exercise program, pain with function and weight-bearing intolerance  Other impairment: Paresthesia b/l hands  Functional limitations: Impaired sleep patterns; hand numbness when driving  Symptom irritability: moderateBarriers to therapy: Arthritis  Understanding of Dx/Px/POC: excellent   Prognosis: good    Goals  STGs (4 weeks)  Patient will be independent in HEP of ROM, nerve glides, edema management  Patient will report an average pain level of 3/10  Patient will demonstrate 10 degree improvement in thumb abduction  Patient will report 25% reduction in paresthesia when sleeping and driving  Patient will be independent in use of short opponens splints to reduce thumb CMC joint pain  LTGs (12 weeks)  Patient will demonstrate independence in a HEP to maintain ROM, strength, and function at discharge  Patient will report an average pain level of 1-2/10 to be independent in daily tasks  Patient will demonstrate LOPEZ of the thumbs to [facility] to be independent in self care tasks  Patient will demonstrate 5/5 muscle strength in the right shoulder, wrist and forearm to be MI for meal prep  Patient will demonstrate right hand strength equal to the left hand to be MI for IADL tasks  Patient will demonstrate 50% reduction in paresthesia of hands during functional tasks      Plan  Patient would benefit from: skilled occupational therapy and custom splinting  Planned modality interventions: ultrasound, thermotherapy: hydrocollator packs and cryotherapy  Planned therapy interventions: activity modification, compression, fine motor coordination training, graded activity, graded exercise, home exercise program, therapeutic exercise, therapeutic activities, stretching, strengthening, patient education, orthotic fitting/training, neuromuscular re-education and manual therapy  Other planned therapy interventions: Nerve glides; IASTM; cupping; short opponens splint; night time elbow extension splint right  Frequency: 2x week  Duration in weeks: 12  Plan of Care beginning date: 5/27/2022  Plan of Care expiration date: 8/27/2022  Treatment plan discussed with: patient        Subjective Evaluation    History of Present Illness  Mechanism of injury: Past reports onset of hand numbness when driving and sleeping beginning several years ago  She is trying to sleep with wrists straight at night and this is helping  Patient is starting to use wrist braces at night  Patient also reports pain in base of thumbs R>L  Patient continues to have numbness and pain when driving    Patient now presents for OT evaluation and treatment            Recurrent probem    Quality of life: good    Pain  Current pain rating: 0  At best pain ratin  At worst pain ratin  Location: Right thumb CMC joint; b/l hand numbness  Quality: throbbing (numbness)  Relieving factors: change in position and medications (Naproxen prn; turmeric, glucosamine/chondrotin; tart cherry juice)  Exacerbated by: driving; sleeping; opening containers  Progression: improved    Social Support  Lives with: spouse and young children    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)  Hand dominance: right    Treatments  Previous treatment: medication (wrist splints)  Patient Goals  Patient goals for therapy: decreased edema, decreased pain, increased motion, increased strength and independence with ADLs/IADLs          Objective     Observations     Left Wrist/Hand   Negative for atrophy and Wartenberg's sign  Right Wrist/Hand   Positive for deformity and Heberden's nodes  Negative for atrophy and Wartenberg's sign  Additional Observation Details  22: + shoulder sign R thumb CMC  Arthritic nodule DIP RMF    Neurological Testing     Sensation     Wrist/Hand   Left   Intact: static two point discrimination    Right   Intact: static two point discrimination    Comments   Left static two point discrimination: 5 mm all digits  Right static two point discrimination: 5 mm all digits except 6 mm small finger    Active Range of Motion   Left Shoulder   Normal active range of motion    Right Shoulder   Flexion: WFL  Extension: WFL  Abduction: WFL  Adduction: WFL  External rotation 0°: 40 degrees   External rotation 45°: 40 degrees   Internal rotation 0°: WFL  Internal rotation 45°: WFL    Left Elbow   Normal active range of motion    Right Elbow   Normal active range of motion    Left Wrist   Normal active range of motion    Right Wrist   Normal active range of motion    Left Thumb   Palmar Abduction     CMC: 35 degrees  Radial abduction    CMC: 35 degrees  Opposition: -: thumb flexion is WNL   Impaired CMC abduction and extension  5 degrees hyperextension MP joint  Kapandji Scale 9/10    Right Thumb   Palmar Abduction    CMC: 35  Radial Abduction    CMC: 25  Opposition: 5/27/22: Able to flex thumb to base of small finger  Kapandji Scale 9/10    Additional Active Range of Motion Details  5/27/22: WNL    Strength/Myotome Testing     Left Shoulder   Normal muscle strength    Right Shoulder     Planes of Motion   Flexion: 4   Extension: 4   Abduction: 4   External rotation at 0°: 3-   Internal rotation at 0°: 5     Left Elbow   Normal strength    Right Elbow   Normal strength    Left Wrist/Hand   Normal wrist strength     (2nd hand position)     Trial 1: 60    Thumb Strength  Key/Lateral Pinch     Trial 1: 15  Tip/Two-Point Pinch     Trial 1: 11 5  Palmar/Three-Point Pinch     Trial 1: 13    Right Wrist/Hand   Normal wrist strength     (2nd hand position)     Trial 1: 50    Thumb Strength   Key/Lateral Pinch     Trial 1: 16  Tip/Two-Point Pinch     Trial 1: 9 5  Palmar/Three-Point Pinch     Trial 1: 12    Additional Strength Details  5/27/22: Pain with right  and pinch strength testing    Tests     Left Elbow   Positive elbow flexion  Negative Tinel's sign (cubital tunnel)  Right Elbow   Positive elbow flexion and Tinel's sign (cubital tunnel)  Left Wrist/Hand   Positive Phalen's sign and Tinel's sign (medial nerve)  Negative Froment's sign  Right Wrist/Hand   Positive Phalen's sign and Tinel's sign (medial nerve)  Negative Froment's sign                Precautions:  Arthritis; b/l CTS and cubital tunnel syndrome; b/l thumb CMC joint pain/arthritis       Date 5/27       Visit 1       Manuals        IASTM CT, cubital tunnel b/l        Cupping cubital tunnel        Kinesio tape R cubital correction                                       Neuro Re-Ed         Distal MNG 5" x 10 full glides b/l       Proximal MNG        EZRA Steps 1-2 5" x 10 b/l       UBE                                Ther Ex        TGE Isolated digit ext and flex b/l        Thumb isometrics                                                Ther Activity                        HEP Positioning for sleep and driving; EZRA; MNG; POC; Kinesio tape                       Modalities        P

## 2022-05-31 ENCOUNTER — OFFICE VISIT (OUTPATIENT)
Dept: OCCUPATIONAL THERAPY | Facility: CLINIC | Age: 67
End: 2022-05-31
Payer: MEDICARE

## 2022-05-31 DIAGNOSIS — M77.8 TENDINITIS OF BOTH WRISTS: ICD-10-CM

## 2022-05-31 DIAGNOSIS — G56.03 CARPAL TUNNEL SYNDROME ON BOTH SIDES: Primary | ICD-10-CM

## 2022-05-31 PROCEDURE — 97112 NEUROMUSCULAR REEDUCATION: CPT

## 2022-05-31 PROCEDURE — 97140 MANUAL THERAPY 1/> REGIONS: CPT

## 2022-05-31 PROCEDURE — 97110 THERAPEUTIC EXERCISES: CPT

## 2022-05-31 NOTE — PROGRESS NOTES
Daily Note     Today's date: 2022  Patient name: Day Ritchie   : 1955  MRN: [id number]  Referring provider: Ced Garibay DO  Dx:   Encounter Diagnosis     ICD-10-CM    1  Carpal tunnel syndrome on both sides  G56 03    2  Tendinitis of both wrists  M77 8        Start Time: 0900          Subjective  "Honestly, I don't have pain like I did 3 months ago "      Objective: See treatment diary below      Assessment: Tolerated treatment well  Pt reporting cramping to RMF and RIF during TGE to R side only requiring additional time for completion, no affects reported to left side  Reviewed distal MNG w/pt  Pt exhibited G technique during therapeutic exercises this session  Patient would benefit from continued OT    Plan: Continue per plan of care        Precautions:  Arthritis; b/l CTS and cubital tunnel syndrome; b/l thumb CMC joint pain/arthritis       Date       Visit 1 2      Manuals        IASTM CT, cubital tunnel b/l  5' ea side      Cupping cubital tunnel  3 min each side      Kinesio tape R cubital correction                                       Neuro Re-Ed         Distal MNG 5" x 10 full glides b/l 5" x 10 full glides b/l      Proximal MNG        EZRA Steps 1-2 5" x 10 b/l Steps 1-2 5" 10 b/l      UBE                                 Ther Ex        TGE  b/l 5" x 10      Isolated digit ext and flex b/l        Thumb isometrics                                                Ther Activity                        HEP Positioning for sleep and driving; EZRA; MNG; POC; Kinesio tape TGE                      Modalities        MHP  5"

## 2022-06-03 ENCOUNTER — OFFICE VISIT (OUTPATIENT)
Dept: OCCUPATIONAL THERAPY | Facility: CLINIC | Age: 67
End: 2022-06-03
Payer: MEDICARE

## 2022-06-03 DIAGNOSIS — M77.8 TENDINITIS OF BOTH WRISTS: ICD-10-CM

## 2022-06-03 DIAGNOSIS — G56.23 CUBITAL TUNNEL SYNDROME OF BOTH UPPER EXTREMITIES: ICD-10-CM

## 2022-06-03 DIAGNOSIS — G56.03 CARPAL TUNNEL SYNDROME ON BOTH SIDES: Primary | ICD-10-CM

## 2022-06-03 PROCEDURE — 97140 MANUAL THERAPY 1/> REGIONS: CPT

## 2022-06-03 PROCEDURE — 97110 THERAPEUTIC EXERCISES: CPT

## 2022-06-03 NOTE — PROGRESS NOTES
Daily Note     Today's date: 6/3/2022  Patient name: Debbie Momin  : 1955  MRN: [id number]  Referring provider: Marlo Miranda DO  Dx:   Encounter Diagnosis     ICD-10-CM    1  Carpal tunnel syndrome on both sides  G56 03    2  Tendinitis of both wrists  M77 8    3  Cubital tunnel syndrome of both upper extremities  G56 23                   Subjective: I'm feeling a little better      Objective: See treatment diary below      Assessment: Tolerated treatment well  Patient exhibited good technique with therapeutic exercises  Patient reports less paresthesia during activities and less pain  Advanced MNG      Plan: Continue per plan of care  Precautions:  Arthritis; b/l CTS and cubital tunnel syndrome; b/l thumb CMC joint pain/arthritis       Date 5/27 5/31 6/3     Visit 1 2 3     Manuals        IASTM CT, cubital tunnel b/l  5' ea side 5' ea side ( 10' total)     Cupping cubital tunnel  3 min each side      Kinesio tape R cubital correction                                       Neuro Re-Ed         Distal MNG 5" x 10 full glides b/l 5" x 10 full glides b/l x10 b/l   Added overhead glide with wrist ext and laternating arm elbow ext w/ shldr flex     Proximal MNG   Step 1 x 10 b/l     EZRA Steps 1-2 5" x 10 b/l Steps 1-2 5" 10 b/l Step 1-2 x 10     UBE    6', L2 in stance ( 3+,-)                             Ther Ex        TGE  b/l 5" x 10 x10 b/l     Isolated digit ext and flex b/l        Thumb isometrics        Digit abd/add   Marbles 1 set b/l     Pencil hook to full fist glide   x20 b/l                             Ther Activity                        HEP Positioning for sleep and driving; EZRA; MNG; POC; Kinesio tape TGE Proximal MNG                     Modalities        MHP  5" 5'

## 2022-06-06 ENCOUNTER — OFFICE VISIT (OUTPATIENT)
Dept: OCCUPATIONAL THERAPY | Facility: CLINIC | Age: 67
End: 2022-06-06
Payer: MEDICARE

## 2022-06-06 DIAGNOSIS — M77.8 TENDINITIS OF BOTH WRISTS: ICD-10-CM

## 2022-06-06 DIAGNOSIS — G56.03 CARPAL TUNNEL SYNDROME ON BOTH SIDES: Primary | ICD-10-CM

## 2022-06-06 DIAGNOSIS — G56.23 CUBITAL TUNNEL SYNDROME OF BOTH UPPER EXTREMITIES: ICD-10-CM

## 2022-06-06 PROCEDURE — 97140 MANUAL THERAPY 1/> REGIONS: CPT

## 2022-06-06 PROCEDURE — 97110 THERAPEUTIC EXERCISES: CPT

## 2022-06-06 NOTE — PROGRESS NOTES
Daily Note     Today's date: 2022  Patient name: Debbie Momin  : 1955  MRN: [id number]  Referring provider: Marlo Miranda DO  Dx:   Encounter Diagnosis     ICD-10-CM    1  Carpal tunnel syndrome on both sides  G56 03    2  Tendinitis of both wrists  M77 8    3  Cubital tunnel syndrome of both upper extremities  G56 23                   Subjective: I drove to see my Mom and I was able to mitigate my symptoms atiya when driving      Objective: See treatment diary below      Assessment: Tolerated treatment well  Patient exhibited good technique with therapeutic exercises      Plan: Continue per plan of care  Precautions:  Arthritis; b/l CTS and cubital tunnel syndrome; b/l thumb CMC joint pain/arthritis       Date 5/27 5/31 6/3 6/6    Visit 1 2 3 4    Manuals        IASTM CT, cubital tunnel b/l  5' ea side 5' ea side ( 10' total) 5' ea wrist    Cupping cubital tunnel  3 min each side      Kinesio tape R cubital correction   CT correction b/l 2'                                    Neuro Re-Ed         Distal MNG 5" x 10 full glides b/l 5" x 10 full glides b/l x10 b/l   Added overhead glide with wrist ext and laternating arm elbow ext w/ shldr flex x10 full glides    Proximal MNG   Step 1 x 10 b/l Step 2 x 10 b/l    EZRA Steps 1-2 5" x 10 b/l Steps 1-2 5" 10 b/l Step 1-2 x 10 Steps 1-3 x 10 b/l    UBE    6', L2 in stance ( 3+,-)                             Ther Ex        TGE  b/l 5" x 10 x10 b/l x10 b/l    Isolated digit ext and flex b/l        Thumb isometrics        Digit abd/add   Marbles 1 set b/l     Pencil hook to full fist glide   x20 b/l x20 b/l                            Ther Activity        Translation    Beads 1 set b/l            HEP Positioning for sleep and driving; EZRA; MNG; POC; Kinesio tape TGE Proximal MNG Step 2 proximal MNG                    Modalities        MHP  5" 5' 5'

## 2022-06-10 ENCOUNTER — OFFICE VISIT (OUTPATIENT)
Dept: OCCUPATIONAL THERAPY | Facility: CLINIC | Age: 67
End: 2022-06-10
Payer: MEDICARE

## 2022-06-10 DIAGNOSIS — G56.03 CARPAL TUNNEL SYNDROME ON BOTH SIDES: Primary | ICD-10-CM

## 2022-06-10 DIAGNOSIS — M77.8 TENDINITIS OF BOTH WRISTS: ICD-10-CM

## 2022-06-10 PROCEDURE — 97112 NEUROMUSCULAR REEDUCATION: CPT

## 2022-06-10 PROCEDURE — 97150 GROUP THERAPEUTIC PROCEDURES: CPT

## 2022-06-10 PROCEDURE — 97140 MANUAL THERAPY 1/> REGIONS: CPT

## 2022-06-10 NOTE — PROGRESS NOTES
Daily Note     Today's date: 6/10/2022  Patient name: Victoriano Goins  : 1955  MRN: [id number]  Referring provider: Hanane Cox DO  Dx:   Encounter Diagnosis     ICD-10-CM    1  Carpal tunnel syndrome on both sides  G56 03    2  Tendinitis of both wrists  M77 8                   Subjective: I'm happy with how my hands are doing      Objective: See treatment diary below      Assessment: Tolerated treatment well  Patient exhibited good technique with therapeutic exercises  Patient reports good symptom relief from kinesio tape      Plan: Continue per plan of care  Precautions:  Arthritis; b/l CTS and cubital tunnel syndrome; b/l thumb CMC joint pain/arthritis       Date 5/27 5/31 6/3 6/6 6/10   Visit 1 2 3 4 5   Manuals        IASTM CT, cubital tunnel b/l  5' ea side 5' ea side ( 10' total) 5' ea wrist 5' ea CT   Cupping cubital tunnel  3 min each side      Kinesio tape R cubital correction   CT correction b/l 2' CT correction 2' b/l                                   Neuro Re-Ed         Distal MNG 5" x 10 full glides b/l 5" x 10 full glides b/l x10 b/l   Added overhead glide with wrist ext and laternating arm elbow ext w/ shldr flex x10 full glides x10 b/l   Proximal MNG   Step 1 x 10 b/l Step 2 x 10 b/l Step 1 x 10 b/l   EZRA Steps 1-2 5" x 10 b/l Steps 1-2 5" 10 b/l Step 1-2 x 10 Steps 1-3 x 10 b/l Steps 1-3 x 10 b/l   UBE    6', L2 in stance ( 3+,-)                             Ther Ex        TGE  b/l 5" x 10 x10 b/l x10 b/l x10 b/l   Isolated digit ext and flex b/l     x10 b/l   Thumb isometrics        Digit abd/add   Marbles 1 set b/l  Marbles 1 set b/l   Pencil hook to full fist glide   x20 b/l x20 b/l x20 b/l   Lumbrical grasp on PW     Y x 20 b/l                   Ther Activity        Translation    Beads 1 set b/l Marbles 1 set b/l           HEP Positioning for sleep and driving; EZRA; MNG; POC; Kinesio tape TGE Proximal MNG Step 2 proximal MNG Reviewed step 2 MNG                   Modalities [facility]  5" 5' 5' 5'

## 2022-06-14 ENCOUNTER — OFFICE VISIT (OUTPATIENT)
Dept: OCCUPATIONAL THERAPY | Facility: CLINIC | Age: 67
End: 2022-06-14
Payer: MEDICARE

## 2022-06-14 DIAGNOSIS — G56.03 CARPAL TUNNEL SYNDROME ON BOTH SIDES: Primary | ICD-10-CM

## 2022-06-14 DIAGNOSIS — G56.23 CUBITAL TUNNEL SYNDROME OF BOTH UPPER EXTREMITIES: ICD-10-CM

## 2022-06-14 DIAGNOSIS — M77.8 TENDINITIS OF BOTH WRISTS: ICD-10-CM

## 2022-06-14 PROCEDURE — 97140 MANUAL THERAPY 1/> REGIONS: CPT

## 2022-06-14 PROCEDURE — 97110 THERAPEUTIC EXERCISES: CPT

## 2022-06-14 NOTE — PROGRESS NOTES
Daily Note     Today's date: 2022  Patient name: Candice Fernandez  : 1955  MRN: [id number]  Referring provider: Jojo Neville DO  Dx:   Encounter Diagnosis     ICD-10-CM    1  Carpal tunnel syndrome on both sides  G56 03    2  Tendinitis of both wrists  M77 8    3  Cubital tunnel syndrome of both upper extremities  G56 23                   Subjective: I'm a little sore  I had to hold the dog leash while he was running and it hurt a little      Objective: See treatment diary below      Assessment: Tolerated treatment well  Patient exhibited good technique with therapeutic exercises  Good performance on nerve glides      Plan: Continue per plan of care  Precautions:  Arthritis; b/l CTS and cubital tunnel syndrome; b/l thumb CMC joint pain/arthritis       Date 6/14 5/31 6/3 6/6 6/10   Visit 6 2 3 4 5   Manuals        IASTM CT, cubital tunnel b/l 5' ea CT 5' ea side 5' ea side ( 10' total) 5' ea wrist 5' ea CT   Cupping cubital tunnel  3 min each side      Kinesio tape    CT correction b/l 2' CT correction 2' b/l                                   Neuro Re-Ed         Distal MNG 5" x 10 full glides b/l 5" x 10 full glides b/l x10 b/l   Added overhead glide with wrist ext and laternating arm elbow ext w/ shldr flex x10 full glides x10 b/l   Proximal MNG Step 2 x 10 b/l  Step 1 x 10 b/l Step 2 x 10 b/l Step 1 x 10 b/l   EZRA Steps 1-3 x 10 b/l Steps 1-2 5" 10 b/l Step 1-2 x 10 Steps 1-3 x 10 b/l Steps 1-3 x 10 b/l   UBE    6', L2 in stance ( 3+,-)                             Ther Ex        TGE x10 b/l b/l 5" x 10 x10 b/l x10 b/l x10 b/l   Isolated digit ext and flex b/l x10 b/l    x10 b/l   Thumb isometrics        Digit abd/add Marbles 1 set b/l  Marbles 1 set b/l  Marbles 1 set b/l   Pencil hook to full fist glide x20 b/l  x20 b/l x20 b/l x20 b/l   Lumbrical grasp on PW R x 20 b/l    Y x 20 b/l                   Ther Activity        Translation Marbles 1 set b/l   Beads 1 set b/l Marbles 1 set b/l HEP  TGE Proximal MNG Step 2 proximal MNG Reviewed step 2 MNG                   Modalities        MHP 5' 5" 5' 5' 5'

## 2022-06-17 ENCOUNTER — OFFICE VISIT (OUTPATIENT)
Dept: OCCUPATIONAL THERAPY | Facility: CLINIC | Age: 67
End: 2022-06-17
Payer: MEDICARE

## 2022-06-17 DIAGNOSIS — G56.03 CARPAL TUNNEL SYNDROME ON BOTH SIDES: Primary | ICD-10-CM

## 2022-06-17 DIAGNOSIS — M77.8 TENDINITIS OF BOTH WRISTS: ICD-10-CM

## 2022-06-17 DIAGNOSIS — G56.23 CUBITAL TUNNEL SYNDROME OF BOTH UPPER EXTREMITIES: ICD-10-CM

## 2022-06-17 PROCEDURE — 97150 GROUP THERAPEUTIC PROCEDURES: CPT

## 2022-06-17 PROCEDURE — 97112 NEUROMUSCULAR REEDUCATION: CPT

## 2022-06-17 NOTE — PROGRESS NOTES
Daily Note     Today's date: 2022  Patient name: Ce Ritter  : 1955  MRN: [id number]  Referring provider: Princess Olivas DO  Dx:   Encounter Diagnosis     ICD-10-CM    1  Carpal tunnel syndrome on both sides  G56 03    2  Tendinitis of both wrists  M77 8    3  Cubital tunnel syndrome of both upper extremities  G56 23        Start Time: 0850          Subjective: "My hands are feeling better  Picking strawberries if good for me, right?"      Objective: See treatment diary below      Assessment: Tolerated treatment well  Patient demonstrated fatigue post treatment      Plan: Continue per plan of care  Precautions:  Arthritis; b/l CTS and cubital tunnel syndrome; b/l thumb CMC joint pain/arthritis       Date 6/14 6/17 6/3 6/6 6/10   Visit 6 7 3 4 5   Manuals        IASTM CT, cubital tunnel b/l 5' ea CT 5' ea side 5' ea side ( 10' total) 5' ea wrist 5' ea CT   Cupping cubital tunnel        Kinesio tape    CT correction b/l 2' CT correction 2' b/l                                   Neuro Re-Ed         Distal MNG 5" x 10 full glides b/l 5" x 10 full glides b/l x10 b/l   Added overhead glide with wrist ext and laternating arm elbow ext w/ shldr flex x10 full glides x10 b/l   Proximal MNG Step 2 x 10 b/l  Step 1 x 10 b/l Step 2 x 10 b/l Step 1 x 10 b/l   EZRA Steps 1-3 x 10 b/l Steps 1-3 x 10 b/l Step 1-2 x 10 Steps 1-3 x 10 b/l Steps 1-3 x 10 b/l   UBE    6', L2 in stance ( 3+,-)                             Ther Ex        TGE x10 b/l x10 b/l x10 b/l x10 b/l x10 b/l   Isolated digit ext and flex b/l x10 b/l    x10 b/l   Thumb isometrics        Digit abd/add Marbles 1 set b/l  Marbles 1 set b/l  Marbles 1 set b/l   Pencil hook to full fist glide x20 b/l  x20 b/l x20 b/l x20 b/l   Lumbrical grasp on PW R x 20 b/l    Y x 20 b/l                   Ther Activity        Translation Marbles 1 set b/l Marbles 1 set b/l  Beads 1 set b/l Marbles 1 set b/l           HEP   Proximal MNG Step 2 proximal MNG Reviewed step 2 MNG                   Modalities        P 5' 5" 5' 5' 5'              6/17:  [id number]:1  [id number]  [id number] self directed

## 2022-06-20 ENCOUNTER — OFFICE VISIT (OUTPATIENT)
Dept: OCCUPATIONAL THERAPY | Facility: CLINIC | Age: 67
End: 2022-06-20
Payer: MEDICARE

## 2022-06-20 DIAGNOSIS — M77.8 TENDINITIS OF BOTH WRISTS: ICD-10-CM

## 2022-06-20 DIAGNOSIS — G56.03 CARPAL TUNNEL SYNDROME ON BOTH SIDES: Primary | ICD-10-CM

## 2022-06-20 PROCEDURE — 97140 MANUAL THERAPY 1/> REGIONS: CPT

## 2022-06-20 PROCEDURE — 97110 THERAPEUTIC EXERCISES: CPT

## 2022-06-20 PROCEDURE — L3933 FO W/O JOINTS CF: HCPCS

## 2022-06-20 NOTE — PROGRESS NOTES
OT Re-Evaluation     Today's date: 2022  Patient name: Addie Lloyd  : 1955  MRN: [id number]  Referring provider: Eli Baxter DO  Dx:   Encounter Diagnosis     ICD-10-CM    1  Carpal tunnel syndrome on both sides  G56 03    2  Tendinitis of both wrists  M77 8                   Assessment  Assessment details: Addie Lloyd is a 78 y o , Right HD female referred to hand therapy for  bilateral hand numbness and thumb pain  Onset of symptoms over one year ago due to unknown etiology  Patient presents 22 with impaired ROM, strength, and sensation of the hands R>L  Deficits also noted in pain and functional use of the bilateral UE  Patient has provocative testing consistent with b/l CTS, cubital tunnel syndrome and right thumb CMC joint arthritis  Patient is a good candidate for OT services to abolish pain and edema and restore ROM, strength, coordination, and sensation for a return to independence in daily tasks  22: Patient attended  OT appts  She is compliant with HEP of median and ulnar nerve glides, thumb isometric strengthening, tendon glides  Patient has been encouraged to wear wrist splints at night to control her symptoms, but she reports difficulty falling asleep with the splints  Patient is aware of positions that compress nerves and has been avoiding these postures  Strength and sensation SQ  Frequency of paresthesia has decreased  Additionally, patient appears to have symptoms consistent with right thumb CMC joint arthritis and this has been affecting function  Issued short opponens splint for thumb pain and instructed in thumb isometrics   Continue OT x 2 visits and then likely DC to HEP    Impairments: abnormal coordination, abnormal or restricted ROM, activity intolerance, impaired physical strength, lacks appropriate home exercise program, pain with function and weight-bearing intolerance  Other impairment: Paresthesia b/l hands, right thumb CMC joint pain  Functional limitations: Impaired sleep patterns; hand numbness when driving  Symptom irritability: lowBarriers to therapy: Arthritis  Understanding of Dx/Px/POC: excellent   Prognosis: good    Goals  STGs (4 weeks)  Patient will be independent in HEP of ROM, nerve glides, edema management  MET  Patient will report an average pain level of 3/10  MET  Patient will demonstrate 10 degree improvement in thumb abduction  MET  Patient will report 25% reduction in paresthesia when sleeping and driving  MET  Patient will be independent in use of short opponens splints to reduce thumb CMC joint pain  MET  LTGs (12 weeks)  Patient will demonstrate independence in a HEP to maintain ROM, strength, and function at discharge  ON GOING  Patient will report an average pain level of 1-2/10 to be independent in daily tasks  MET  Patient will demonstrate LOPEZ of the thumbs to [facility] to be independent in self care tasks  MET  Patient will demonstrate 5/5 muscle strength in the right shoulder, wrist and forearm to be MI for meal prep  MET  Patient will demonstrate right hand strength equal to the left hand to be MI for IADL tasks  NOT MET  Patient will demonstrate 50% reduction in paresthesia of hands during functional tasks   MET      Plan  Plan details: 6/20/22: Continue OT x 2 more visits then possible DC to HEP    Patient would benefit from: skilled occupational therapy and custom splinting  Planned modality interventions: ultrasound, thermotherapy: hydrocollator packs and cryotherapy  Planned therapy interventions: activity modification, compression, fine motor coordination training, graded activity, graded exercise, home exercise program, therapeutic exercise, therapeutic activities, stretching, strengthening, patient education, orthotic fitting/training, neuromuscular re-education and manual therapy  Other planned therapy interventions: Nerve glides; IASTM; cupping; short opponens splint; night time elbow extension splint right  Frequency: 2x week  Duration in weeks: 12  Plan of Care beginning date: 2022  Plan of Care expiration date: 2022  Treatment plan discussed with: patient        Subjective Evaluation    History of Present Illness  Mechanism of injury: Past reports onset of hand numbness when driving and sleeping beginning several years ago  She is trying to sleep with wrists straight at night and this is helping  Patient is starting to use wrist braces at night  Patient also reports pain in base of thumbs R>L  Patient continues to have numbness and pain when driving    Patient now presents for OT evaluation and treatment  22: I feel better  When I drive it's not as numb and I feel like I know how to stretch it out better          Recurrent probem    Quality of life: good    Pain  Current pain ratin  At best pain ratin  At worst pain ratin  Location: Right thumb CMC joint; b/l hand numbness  Quality: dull ache and radiating (numbness)  Relieving factors: change in position and medications (Naproxen prn; turmeric, glucosamine/chondrotin; tart cherry juice)  Exacerbated by: driving; sleeping; opening containers  Progression: improved    Social Support  Lives with: spouse and young children    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)  Hand dominance: right    Treatments  Previous treatment: medication (wrist splints)  Current treatment: occupational therapy  Patient Goals  Patient goals for therapy: decreased edema, decreased pain, increased motion, increased strength and independence with ADLs/IADLs          Objective     Observations     Left Wrist/Hand   Negative for atrophy and Wartenberg's sign  Right Wrist/Hand   Positive for deformity and Heberden's nodes  Negative for atrophy and Wartenberg's sign  Additional Observation Details  22: + shoulder sign R thumb CMC   Arthritic nodule DIP RMF  22: SQ  Cyst noted right volar wrist    Neurological Testing     Sensation Wrist/Hand   Left   Intact: static two point discrimination    Right   Intact: static two point discrimination    Comments   Left static two point discrimination: 5 mm all digits  Right static two point discrimination: 5 mm all digits except 6 mm small finger    Active Range of Motion   Left Shoulder   Normal active range of motion    Right Shoulder   Flexion: WFL  Extension: WFL  Abduction: WFL  Adduction: WFL  External rotation 0°: 40 degrees   External rotation 45°: 40 degrees   Internal rotation 0°: WFL  Internal rotation 45°: WFL    Left Elbow   Normal active range of motion    Right Elbow   Normal active range of motion    Left Wrist   Normal active range of motion    Right Wrist   Normal active range of motion    Left Thumb   Palmar Abduction     CMC: 40 degrees  Radial abduction    CMC: 40 degrees  Opposition: 5/27/22: thumb flexion is WNL  Impaired CMC abduction and extension  5 degrees hyperextension MP joint  Kapandji Scale 9/10    6/20/22: Improved thumb abduction and flexion  Kapandji Scale 10/10        Right Thumb   Palmar Abduction    CMC: 40  Radial Abduction    CMC: 35  Opposition: 5/27/22: Able to flex thumb to base of small finger  Kapandji Scale 9/10    6/20/22: Improved thumb abduction   Kapandji scale 9/10    Additional Active Range of Motion Details  5/27/22: WNL    Strength/Myotome Testing     Left Shoulder   Normal muscle strength    Right Shoulder     Planes of Motion   Flexion: 4   Extension: 4   Abduction: 4   External rotation at 0°: 3-   Internal rotation at 0°: 5     Left Elbow   Normal strength    Right Elbow   Normal strength    Left Wrist/Hand   Normal wrist strength     (2nd hand position)     Trial 1: 60    Thumb Strength  Key/Lateral Pinch     Trial 1: 15  Tip/Two-Point Pinch     Trial 1: 10 5  Palmar/Three-Point Pinch     Trial 1: 12    Right Wrist/Hand   Normal wrist strength     (2nd hand position)     Trial 1: 50    Thumb Strength   Key/Lateral Pinch     Trial 1: 16  Tip/Two-Point Pinch     Trial 1: 12  Palmar/Three-Point Pinch     Trial 1: 12    Additional Strength Details  5/27/22: Pain with right  and pinch strength testing    6/20/22: SQ except 1 lb decrease in right 2 and 3 point pinch strength  This appears related to arthritis more than nerve compression    Tests     Left Elbow   Positive Tinel's sign (cubital tunnel)  Negative elbow flexion  Right Elbow   Positive Tinel's sign (cubital tunnel)  Negative elbow flexion  Left Wrist/Hand   Positive Phalen's sign  Negative Froment's sign and Tinel's sign (medial nerve)  Right Wrist/Hand   Positive CMC grind, CMC shoulder sign and Phalen's sign  Negative Froment's sign and Tinel's sign (medial nerve)       Additional Tests Details  6/20/22: Patient only got tingling in ulnar nerve distribution at the end of 1 minute elbow flexion  6/20/22: Now has negative Tinel's sign at CT, but still has + Phalen's             Precautions:  Arthritis; b/l CTS and cubital tunnel syndrome; b/l thumb CMC joint pain/arthritis       Date 6/14 6/17 6/20 6/6 6/10   Visit 6 7 8 Re eval 4 5   Manuals        IASTM CT, cubital tunnel b/l 5' ea CT 5' ea side 5' ea side ( 10' total) 5' ea wrist 5' ea CT   Cupping cubital tunnel        Kinesio tape    CT correction b/l 2' CT correction 2' b/l                                   Ortho fit/train   R short opponens     Neuro Re-Ed         Distal MNG 5" x 10 full glides b/l 5" x 10 full glides b/l x10 b/l full glides x10 full glides x10 b/l   Proximal MNG Step 2 x 10 b/l  Step 2 x 10 b/l Step 2 x 10 b/l Step 1 x 10 b/l   EZRA Steps 1-3 x 10 b/l Steps 1-3 x 10 b/l Step 1-3 x 10 Steps 1-3 x 10 b/l Steps 1-3 x 10 b/l   UBE                                 Ther Ex        TGE x10 b/l x10 b/l x10 b/l x10 b/l x10 b/l   Isolated digit ext and flex b/l x10 b/l    x10 b/l   Thumb isometrics        Digit abd/add Marbles 1 set b/l  Marbles 1 set b/l  Marbles 1 set b/l   Pencil hook to full fist glide x20 b/l  x20 b/l x20 b/l x20 b/l   Lumbrical grasp on PW R x 20 b/l  R x 20 b/l  Y x 20 b/l                   Ther Activity        Translation Marbles 1 set b/l Marbles 1 set b/l  Beads 1 set b/l Marbles 1 set b/l           HEP   POC   Short opponens wear, care, precautions Step 2 proximal MNG Reviewed step 2 MNG                   Modalities        P 5' 5" 5' 5' 5'

## 2022-06-24 ENCOUNTER — OFFICE VISIT (OUTPATIENT)
Dept: OCCUPATIONAL THERAPY | Facility: CLINIC | Age: 67
End: 2022-06-24
Payer: MEDICARE

## 2022-06-24 DIAGNOSIS — G56.03 CARPAL TUNNEL SYNDROME ON BOTH SIDES: Primary | ICD-10-CM

## 2022-06-24 DIAGNOSIS — M77.8 TENDINITIS OF BOTH WRISTS: ICD-10-CM

## 2022-06-24 PROCEDURE — 97140 MANUAL THERAPY 1/> REGIONS: CPT

## 2022-06-24 PROCEDURE — 97110 THERAPEUTIC EXERCISES: CPT

## 2022-06-24 NOTE — PROGRESS NOTES
Daily Note/Discharge     Today's date: 2022  Patient name: Samy Loera  : 1955  MRN: [id number]  Referring provider: Amira Hernandez DO  Dx:   Encounter Diagnosis     ICD-10-CM    1  Carpal tunnel syndrome on both sides  G56 03    2  Tendinitis of both wrists  M77 8                   Subjective: I know my exercises  My hand is usually worse in the morning and gets better as the day goes on      Objective: See treatment diary below      Assessment: Tolerated treatment well  Patient exhibited good technique with therapeutic exercises  Patient able to perform HEP independently  Good improvement in symptoms noted  Goals  STGs (4 weeks)  Patient will be independent in HEP of ROM, nerve glides, edema management  MET  Patient will report an average pain level of 3/10  MET  Patient will demonstrate 10 degree improvement in thumb abduction  MET  Patient will report 25% reduction in paresthesia when sleeping and driving  MET  Patient will be independent in use of short opponens splints to reduce thumb CMC joint pain  MET  LTGs (12 weeks)  Patient will demonstrate independence in a HEP to maintain ROM, strength, and function at discharge  MET  Patient will report an average pain level of 1-2/10 to be independent in daily tasks  MET  Patient will demonstrate LOPEZ of the thumbs to [facility] to be independent in self care tasks  MET  Patient will demonstrate 5/5 muscle strength in the right shoulder, wrist and forearm to be MI for meal prep  MET  Patient will demonstrate right hand strength equal to the left hand to be MI for IADL tasks  NOT MET  Patient will demonstrate 50% reduction in paresthesia of hands during functional tasks   MET     Plan: DC to HEP     Precautions:  Arthritis; b/l CTS and cubital tunnel syndrome; b/l thumb CMC joint pain/arthritis       Date 6/14 6/17 6/20 6/24 6/10   Visit 6 7 8 Re eval 9 DC 5   Manuals        IASTM CT, cubital tunnel b/l 5' ea CT 5' ea side 5' ea side ( 10' total) 5' ea wrist 5' ea CT   Cupping cubital tunnel        Kinesio tape     CT correction 2' b/l                                   Ortho fit/train   R short opponens     Neuro Re-Ed         Distal MNG 5" x 10 full glides b/l 5" x 10 full glides b/l x10 b/l full glides x10 full glides x10 b/l   Proximal MNG Step 2 x 10 b/l  Step 2 x 10 b/l Step 2 x 10 b/l Step 1 x 10 b/l   EZRA Steps 1-3 x 10 b/l Steps 1-3 x 10 b/l Step 1-3 x 10 Steps 1-3 x 10 b/l Steps 1-3 x 10 b/l   UBE                                 Ther Ex        TGE x10 b/l x10 b/l x10 b/l x10 b/l x10 b/l   Isolated digit ext and flex b/l x10 b/l    x10 b/l   Thumb isometrics        Digit abd/add Marbles 1 set b/l  Marbles 1 set b/l  Marbles 1 set b/l   Pencil hook to full fist glide x20 b/l  x20 b/l x20 b/l x20 b/l   Lumbrical grasp on PW R x 20 b/l  R x 20 b/l R x 20 b/l Y x 20 b/l                   Ther Activity        Translation Marbles 1 set b/l Marbles 1 set b/l   Marbles 1 set b/l           HEP   POC   Short opponens wear, care, precautions Reviewed nerve glides and TGE for HEP Reviewed step 2 MNG                   Modalities        MHP 5' 5" 5' 5' 5'

## 2022-09-19 ENCOUNTER — TELEPHONE (OUTPATIENT)
Dept: GASTROENTEROLOGY | Facility: CLINIC | Age: 67
End: 2022-09-19

## 2022-09-19 NOTE — TELEPHONE ENCOUNTER
09/19/22  Screened by: Estefani Marrero    Referring Provider Dr Miguelito Trent    Pre- Screening: There is no height or weight on file to calculate BMI  Has patient been referred for a routine screening Colonoscopy? yes  Is the patient between 39-70 years old? yes      Previous Colonoscopy yes   If yes:    Date: 3 years    Facility:     Reason:       SCHEDULING STAFF: If the patient is between 39yrs-47yrs, please advise patient to confirm benefits/coverage with their insurance company for a routine screening colonoscopy, some insurance carriers will only cover at [address] or older  If the patient is over 66years old, please schedule an office visit  Does the patient want to see a Gastroenterologist prior to their procedure OR are they having any GI symptoms? no    Has the patient been hospitalized or had abdominal surgery in the past 6 months? no    Does the patient use supplemental oxygen? no    Does the patient take Coumadin, Lovenox, Plavix, Elliquis, Xarelto, or other blood thinning medication? no    Has the patient had a stroke, cardiac event, or stent placed in the past year? no    SCHEDULING STAFF: If patient answers NO to above questions, then schedule procedure  If patient answers YES to above questions, then schedule office appointment  Pt passed OA and can be reached at [phone]      If patient is between 45yrs - 49yrs, please advise patient that we will have to confirm benefits & coverage with their insurance company for a routine screening colonoscopy

## 2022-09-21 ENCOUNTER — PREP FOR PROCEDURE (OUTPATIENT)
Dept: GASTROENTEROLOGY | Facility: CLINIC | Age: 67
End: 2022-09-21

## 2022-09-21 DIAGNOSIS — Z86.010 HISTORY OF COLON POLYPS: Primary | ICD-10-CM

## 2022-09-21 DIAGNOSIS — Z12.11 SCREENING FOR COLON CANCER: ICD-10-CM

## 2022-09-21 NOTE — TELEPHONE ENCOUNTER
Spoke with the patient and she scheduled her procedure  Prep instructions mailed to her home  Scheduled date of colonoscopy (as of today):11/16/22  Physician performing colonoscopy: Mal  Location of colonoscopy:[facility]  Bowel prep reviewed with patient:miralax/dulcolax - new prep  Instructions reviewed with patient by:Soha GARRIDO  Clearances: none

## 2022-11-09 ENCOUNTER — OFFICE VISIT (OUTPATIENT)
Dept: FAMILY MEDICINE CLINIC | Facility: MEDICAL CENTER | Age: 67
End: 2022-11-09

## 2022-11-09 VITALS
WEIGHT: 169.4 LBS | SYSTOLIC BLOOD PRESSURE: 138 MMHG | HEART RATE: 68 BPM | BODY MASS INDEX: 26.53 KG/M2 | RESPIRATION RATE: 16 BRPM | TEMPERATURE: 98.8 F | DIASTOLIC BLOOD PRESSURE: 76 MMHG

## 2022-11-09 DIAGNOSIS — Z23 NEED FOR IMMUNIZATION AGAINST INFLUENZA: ICD-10-CM

## 2022-11-09 DIAGNOSIS — Z76.89 ENCOUNTER TO ESTABLISH CARE WITH NEW DOCTOR: Primary | ICD-10-CM

## 2022-11-09 DIAGNOSIS — G56.03 CARPAL TUNNEL SYNDROME ON BOTH SIDES: ICD-10-CM

## 2022-11-09 DIAGNOSIS — Z00.00 HEALTHCARE MAINTENANCE: ICD-10-CM

## 2022-11-09 DIAGNOSIS — H01.9 INFECTION OF EYELID: ICD-10-CM

## 2022-11-09 DIAGNOSIS — Z96.641 STATUS POST TOTAL REPLACEMENT OF RIGHT HIP: ICD-10-CM

## 2022-11-09 DIAGNOSIS — E78.5 HYPERLIPIDEMIA, UNSPECIFIED HYPERLIPIDEMIA TYPE: ICD-10-CM

## 2022-11-09 RX ORDER — ERYTHROMYCIN 5 MG/G
0.5 OINTMENT OPHTHALMIC
Qty: 14 G | Refills: 0 | Status: SHIPPED | OUTPATIENT
Start: 2022-11-09 | End: 2022-11-23

## 2022-11-09 NOTE — PROGRESS NOTES
[facility] - Clinic Note  Lakshmi Jimenez, 21     Maryana Chung MRN: [id number] : 1955 Age: 78 y o  Assessment/Plan     1  Encounter to establish care with new doctor    - patient presents for follow-up and to establish care with new provider  - patient will return for Medicare annual wellness visit  - also follow-up in March for well-woman exam    2  Need for immunization against influenza    - influenza vaccine, high-dose, PF 0 7 mL (FLUZONE HIGH-DOSE)    3  Hyperlipidemia, unspecified hyperlipidemia type    - I have reviewed pertinent labs:  Lipid Profile:   Lab Results   Component Value Date    CHOLESTEROL 230 (H) 2022    HDL 41 (L) 2022    TRIG 136 2022    LDLCALC 162 (H) 2022    [place] 189 2022   - Lipid Panel with Direct LDL reflex; Future  - The 10-year ASCVD risk score (Carri Andres et al , 2013) is: 9 3%    Values used to calculate the score:      Age: 78 years      Sex: Female      Is Non- : No      Diabetic: No      Tobacco smoker: No      Systolic Blood Pressure: 524 mmHg      Is BP treated: No      HDL Cholesterol: 41 mg/dL      Total Cholesterol: 230 mg/dL  - discussed with patient that she may benefit from statin for cardiovascular protection standpoint  - she will start fish oil supplementation and we will follow-up repeat lipid panel in 3 months, at that time will review and revisit about statin as appropriate  - nutritional counseling provided and educational material distributed about Mediterranean diet  - patient is active with walking dog regularly    4  Status post total replacement of right hip      5   Healthcare maintenance    - advised about COVID-19 bivalent vaccination   - advised about Shingrix vaccination series, she will complete at pharmacy in future, advised patient about symptoms she may experience for 2-3 days after receiving vaccination  - patient has colonoscopy and DXA scan scheduled  - sees Dermatology for skin checks, uses appropriate sun protection and sunscreen    6  Carpal tunnel syndrome on both sides    - patient has tried conservative measures for management  - advised patient if any worsening symptoms, can consider consultation with orthopedic hand surgeon    7  Infection of eyelid    - erythromycin (ILOTYCIN) ophthalmic ointment; Administer 0 5 inches to the right eye daily at bedtime for 14 days  Dispense: 14 g; Refill: 0  - warm compress      BMI Counseling: Body mass index is 26 53 kg/m²  The BMI is above normal  Nutrition recommendations include encouraging healthy choices of fruits and vegetables  Exercise recommendations include exercising 3-5 times per week  Rationale for BMI follow-up plan is due to patient being overweight or obese  Walks dog regularly  Pablo Ricks acknowledged understanding of treatment plan, all questions answered  Subjective      Pablo Ricks is a 78 y o  female who presents for follow up  Patient did see orthopedics in May for carpal tunnel syndrome bilateral, cubital tunnel syndrome bilateral, tendonitis bilateral   Patient did participate with occupational therapy  Could not use cock-up wrist splints consistently  No longer using naproxen or topical Voltaren gel  Patient does take ibuprofen p r n  which offers some relief  Patient has past medical history including hyperlipidemia  Patient states she does have family history of hyperlipidemia  Patient has young dog and grandson which keep her active  Patient states she walks multiple times a day, engages in housework and yard work, and does yoga  Patient does have complain today of right lower eye lid lesion, noticed a few days ago after doing yard work      The following portions of the patient's history were reviewed and updated as appropriate: allergies, current medications, past family history, past medical history, past social history, past surgical history and problem list      Past Medical History:   Diagnosis Date   • Arthritis    • Colon polyp    • Degenerative joint disease of right hip    • High cholesterol     treated with diet changes and fish oil   • Hip pain        Allergies   Allergen Reactions   • Tramadol GI Intolerance       Past Surgical History:   Procedure Laterality Date   • COLONOSCOPY     • HYSTERECTOMY     • JOINT REPLACEMENT  04/16/2016   • TX COLONOSCOPY FLX DX W/COLLJ SPEC WHEN PFRMD N/A 2/3/2018    Procedure: COLONOSCOPY;  Surgeon: Eber Baumann MD;  Location: [facility]; Service: Gastroenterology   • TX TOTAL HIP ARTHROPLASTY Right 4/18/2016    Procedure: TOTAL HIP ARTHROPLASTY ;  Surgeon: Daniele Floyd MD;  Location[facility];  Service: Orthopedics   • SHOULDER SURGERY Right     reconstruction for chronic anterior dislocation   • TONSILLECTOMY      with adenoidectomy   • TUBAL LIGATION         Family History   Problem Relation Age of Onset   • Hypertension Mother    • Alzheimer's disease Mother    • Dementia Mother    • COPD Father    • Pulmonary fibrosis Father    • Asthma Sister        Social History     Socioeconomic History   • Marital status: /Civil Union     Spouse name: None   • Number of children: None   • Years of education: None   • Highest education level: None   Occupational History   • Occupation:      Comment: 30 years   Tobacco Use   • Smoking status: Never Smoker   • Smokeless tobacco: Never Used   • Tobacco comment: SECOND HAND SMOKE   Vaping Use   • Vaping Use: Never used   Substance and Sexual Activity   • Alcohol use:  Yes     Alcohol/week: 0 0 standard drinks     Comment: occaisional   • Drug use: No   • Sexual activity: Not Currently     Partners: Male     Birth control/protection: Post-menopausal, Surgical   Other Topics Concern   • None   Social History Narrative   • None     Social Determinants of Health     Financial Resource Strain: Not on file   Food Insecurity: Not on file   Transportation Needs: Not on file   Physical Activity: Not on file   Stress: Not on file   Social Connections: Not on file   Intimate Partner Violence: Not on file   Housing Stability: Not on file       Current Outpatient Medications   Medication Sig Dispense Refill   • erythromycin (ILOTYCIN) ophthalmic ointment Administer 0 5 inches to the right eye daily at bedtime for 14 days 14 g 0   • fluticasone (FLONASE) 50 mcg/act nasal spray 1 spray into each nostril daily 1 Bottle 3     No current facility-administered medications for this visit  Review of Systems     As noted in HPI    Objective      /76   Pulse 68   Temp 98 8 °F (37 1 °C)   Resp 16   Wt 76 8 kg (169 lb 6 4 oz)   LMP  (LMP Unknown)   BMI 26 53 kg/m²     Physical Exam  Vitals reviewed  Constitutional:       General: She is not in acute distress  Appearance: Normal appearance  HENT:      Head: Normocephalic  Right Ear: External ear normal       Left Ear: External ear normal       Nose: Nose normal       Mouth/Throat:      Mouth: Mucous membranes are moist       Pharynx: Oropharynx is clear  Eyes:      General: Lids are everted, no foreign bodies appreciated  Gaze aligned appropriately  Extraocular Movements: Extraocular movements intact  Right eye: Normal extraocular motion  Left eye: Normal extraocular motion  Conjunctiva/sclera: Conjunctivae normal       Right eye: Right conjunctiva is not injected  No chemosis or exudate  Left eye: Left conjunctiva is not injected  No chemosis or exudate  Pupils: Pupils are equal, round, and reactive to light  Cardiovascular:      Rate and Rhythm: Normal rate and regular rhythm  Pulses: Normal pulses  Heart sounds: Normal heart sounds  Pulmonary:      Effort: Pulmonary effort is normal       Breath sounds: Normal breath sounds  Abdominal:      General: Abdomen is flat  Bowel sounds are normal       Palpations: Abdomen is soft  Tenderness:  There is no abdominal tenderness  Musculoskeletal:      Cervical back: Neck supple  Right lower leg: No edema  Left lower leg: No edema  Skin:     General: Skin is warm and dry  Findings: Lesion (right lower eyelid lesion ) present  Comments: Lentigines    Neurological:      Mental Status: She is alert and oriented to person, place, and time  Psychiatric:         Mood and Affect: Mood normal          Behavior: Behavior normal          Thought Content: Thought content normal          Judgment: Judgment normal              Some portions of this record may have been generated with voice recognition software  There may be translation, syntax, or grammatical errors  Occasional wrong word or "sound-a-like" substitutions may have occurred due to the inherent limitations of the voice recognition software  Read the chart carefully and recognize, using context, where substations may have occurred  If you have any questions, please contact the dictating provider for clarification or correction, as needed

## 2022-11-09 NOTE — PATIENT INSTRUCTIONS
Mediterranean Diet   AMBULATORY CARE:   A Mediterranean diet  is a meal plan that includes foods that are commonly eaten in countries that border the [place]  This meal plan may provide several health benefits  These include losing or maintaining weight, and decreasing blood pressure, blood sugar, and cholesterol levels  It may also help protect against certain health conditions such as heart disease, cancer, type 2 diabetes, and Alzheimer disease  Work with a dietitian to develop a meal plan that is right for you  Foods to include in the [address] [facility] diet:   Include fruits and vegetables in each meal   Eat a variety of fresh fruits and vegetables  Choose whole grains every day  These foods include whole-grain breads, pastas, and cereals  It also includes brown rice, quinoa, and millet  Use unsaturated fats instead of saturated fats  Cook with olive or canola oil  Limit saturated fats, such as butter, margarine, and shortening  Saturated fat is an unhealthy fat that can increase your cholesterol levels  Choose plant foods, poultry, and fish as your main sources of protein  Eat plant-based foods that provide protein,  such as lentils, beans, chickpeas, nuts, and seeds  Choose mostly plant-based foods in place of meat on most days of the week  Eat protein foods high in omega-3 fats  Fish high in omega-3 fats include salmon, trout, and tuna  Include these types of fish 1 or 2 times each week  Limit fish high in mercury, such as shark, swordfish, tilefish, and halley mackerel  Omega-3 fats are also found in walnuts and flaxseed  Choose poultry (chicken or turkey)  without skin instead of red meat  Red meat is high in saturated fat  Limit eggs and high-fat meats, such as nicolas, sausage, and hot dogs  Choose low-fat dairy foods  such as nonfat or 1% milk, or low-fat almond, cashew, or soy milk  Other examples include low-fat cheese, yogurt, and cottage cheese  Limit sweets  Limit your intake of high-sugar foods, such as soda, desserts, and candy  Talk to your healthcare provider about alcohol  Studies have shown that moderate intake of wine may reduce the risk of heart disease  A moderate amount of wine is 1 serving for women and men 65 years and older each day  Two servings is recommended for men 24to 59years of age each day  A serving of wine is 5 ounces  Other things you need to know if you follow the Mediterranean diet:   Include foods high in iron and vitamin C   Plant-based foods that are high in iron include spinach, beans, tofu, and artichoke  Eat a serving of vitamin C with any iron-rich food to help your body absorb more iron  Examples include oranges, strawberries, cantaloupe, broccoli, and yellow peppers  Get regular physical activity  The Mediterranean diet will have the most benefit if you get regular physical activity  Get 30 minutes of physical activity at least 5 days a week  Choose physical activities that increase your heart rate  Examples include walking, hiking, swimming, and riding a bike  Ask your healthcare provider about the best exercise plan for you  © Copyright [employer] 2022 Information is for End User's use only and may not be sold, redistributed or otherwise used for commercial purposes  All illustrations and images included in CareNotes® are the copyrighted property of [employer]  or Stephanie Sarmiento  The above information is an  only  It is not intended as medical advice for individual conditions or treatments  Talk to your doctor, nurse or pharmacist before following any medical regimen to see if it is safe and effective for you

## 2022-11-14 ENCOUNTER — TELEPHONE (OUTPATIENT)
Dept: GASTROENTEROLOGY | Facility: CLINIC | Age: 67
End: 2022-11-14

## 2022-11-14 NOTE — TELEPHONE ENCOUNTER
Patients GI provider:  Dr Yousif Davila    Number to return call: ([phone])    Reason for call: Pt calling to reschedule her November 16th colonoscopy to January 12th per her request  Whole family is sick and she wanted after the holidays  Pt is rescheduled  Thank you!     Scheduled procedure/appointment date if applicable: Apt/procedure 01/12/23

## 2023-01-12 ENCOUNTER — ANESTHESIA (OUTPATIENT)
Dept: GASTROENTEROLOGY | Facility: HOSPITAL | Age: 68
End: 2023-01-12

## 2023-01-12 ENCOUNTER — ANESTHESIA EVENT (OUTPATIENT)
Dept: GASTROENTEROLOGY | Facility: HOSPITAL | Age: 68
End: 2023-01-12

## 2023-01-12 ENCOUNTER — HOSPITAL ENCOUNTER (OUTPATIENT)
Dept: GASTROENTEROLOGY | Facility: HOSPITAL | Age: 68
Setting detail: OUTPATIENT SURGERY
Discharge: HOME | End: 2023-01-12
Attending: INTERNAL MEDICINE

## 2023-01-12 VITALS
DIASTOLIC BLOOD PRESSURE: 70 MMHG | BODY MASS INDEX: 26.61 KG/M2 | RESPIRATION RATE: 20 BRPM | HEIGHT: 66 IN | OXYGEN SATURATION: 96 % | SYSTOLIC BLOOD PRESSURE: 145 MMHG | WEIGHT: 165.57 LBS | TEMPERATURE: 98.4 F | HEART RATE: 79 BPM

## 2023-01-12 DIAGNOSIS — Z86.010 HISTORY OF COLON POLYPS: ICD-10-CM

## 2023-01-12 DIAGNOSIS — Z12.11 SCREENING FOR COLON CANCER: ICD-10-CM

## 2023-01-12 RX ORDER — SODIUM CHLORIDE, SODIUM LACTATE, POTASSIUM CHLORIDE, CALCIUM CHLORIDE 600; 310; 30; 20 MG/100ML; MG/100ML; MG/100ML; MG/100ML
INJECTION, SOLUTION INTRAVENOUS CONTINUOUS PRN
Status: DISCONTINUED | OUTPATIENT
Start: 2023-01-12 | End: 2023-01-12

## 2023-01-12 RX ORDER — PROPOFOL 10 MG/ML
INJECTION, EMULSION INTRAVENOUS AS NEEDED
Status: DISCONTINUED | OUTPATIENT
Start: 2023-01-12 | End: 2023-01-12

## 2023-01-12 RX ORDER — LIDOCAINE HYDROCHLORIDE 20 MG/ML
INJECTION, SOLUTION EPIDURAL; INFILTRATION; INTRACAUDAL; PERINEURAL AS NEEDED
Status: DISCONTINUED | OUTPATIENT
Start: 2023-01-12 | End: 2023-01-12

## 2023-01-12 RX ADMIN — PROPOFOL 30 MG: 10 INJECTION, EMULSION INTRAVENOUS at 09:52

## 2023-01-12 RX ADMIN — PROPOFOL 100 MG: 10 INJECTION, EMULSION INTRAVENOUS at 09:49

## 2023-01-12 RX ADMIN — PROPOFOL 30 MG: 10 INJECTION, EMULSION INTRAVENOUS at 10:00

## 2023-01-12 RX ADMIN — SODIUM CHLORIDE, SODIUM LACTATE, POTASSIUM CHLORIDE, AND CALCIUM CHLORIDE: .6; .31; .03; .02 INJECTION, SOLUTION INTRAVENOUS at 09:42

## 2023-01-12 RX ADMIN — LIDOCAINE HYDROCHLORIDE 100 MG: 20 INJECTION, SOLUTION EPIDURAL; INFILTRATION; INTRACAUDAL at 09:49

## 2023-01-12 RX ADMIN — PROPOFOL 40 MG: 10 INJECTION, EMULSION INTRAVENOUS at 09:57

## 2023-01-12 RX ADMIN — PROPOFOL 30 MG: 10 INJECTION, EMULSION INTRAVENOUS at 10:02

## 2023-01-12 RX ADMIN — PROPOFOL 30 MG: 10 INJECTION, EMULSION INTRAVENOUS at 09:54

## 2023-01-12 NOTE — H&P
History and Physical - [facility]  Garrick Mcclelland 75 y o  female MRN: [id number]                  HPI: Garrick Mcclelland is a 75y o  year old female who presents for colonoscopy for history of colon polyps      REVIEW OF SYSTEMS: Per the HPI, and otherwise unremarkable  Historical Information   Past Medical History:   Diagnosis Date   • Arthritis    • Colon polyp    • Degenerative joint disease of right hip    • High cholesterol     treated with diet changes and fish oil   • Hip pain      Past Surgical History:   Procedure Laterality Date   • COLONOSCOPY     • HYSTERECTOMY     • JOINT REPLACEMENT  04/16/2016   • CO ARTHRP ACETBLR/PROX FEM PROSTC AGRFT/ALGRFT Right 4/18/2016    Procedure: TOTAL HIP ARTHROPLASTY ;  Surgeon: Jodi Bardales MD;  [facility];  Service: Orthopedics   • CO COLONOSCOPY FLX DX W/COLLJ SPEC WHEN PFRMD N/A 2/3/2018    Procedure: COLONOSCOPY;  Surgeon: Jack Hutton MD;  Location: [facility]; Service: Gastroenterology   • SHOULDER SURGERY Right     reconstruction for chronic anterior dislocation   • TONSILLECTOMY      with adenoidectomy   • TUBAL LIGATION       Social History   Social History     Substance and Sexual Activity   Alcohol Use Yes   • Alcohol/week: 0 0 standard drinks    Comment: occaisional     Social History     Substance and Sexual Activity   Drug Use No     Social History     Tobacco Use   Smoking Status Never   Smokeless Tobacco Never   Tobacco Comments    SECOND HAND SMOKE     Family History   Problem Relation Age of Onset   • Hypertension Mother    • Alzheimer's disease Mother    • Dementia Mother    • COPD Father    • Pulmonary fibrosis Father    • Asthma Sister        Meds/Allergies     (Not in a hospital admission)      Allergies   Allergen Reactions   • Tramadol GI Intolerance       Objective     Blood pressure 118/64, pulse 75, temperature 98 6 °F (37 °C), temperature source Oral, resp   rate 12, height 5' 6" (1 676 m), weight 75 1 kg (165 lb 9 1 oz), SpO2 98 %  PHYSICAL EXAM    /64   Pulse 75   Temp 98 6 °F (37 °C) (Oral)   Resp 12   Ht 5' 6" (1 676 m)   Wt 75 1 kg (165 lb 9 1 oz)   LMP  (LMP Unknown)   SpO2 98%   BMI 26 72 kg/m²       Gen: NAD  CV: RRR  CHEST: Clear  ABD: soft, NT/ND  EXT: no edema      ASSESSMENT/PLAN:  This is a 75y o  year old female here for colonoscopy, and she is stable and optimized for her procedure

## 2023-01-12 NOTE — ANESTHESIA POSTPROCEDURE EVALUATION
Post-Op Assessment Note    CV Status:  Stable  Pain Score: 0    Pain management: adequate     Mental Status:  Alert and awake   Hydration Status:  Euvolemic   PONV Controlled:  Controlled   Airway Patency:  Patent      Post Op Vitals Reviewed: Yes      Staff: CRNA         No notable events documented      /63 (01/12/23 1012)    Temp 98 4 °F (36 9 °C) (01/12/23 1012)    Pulse 78 (01/12/23 1012)   Resp 14 (01/12/23 1012)    SpO2 95 % (01/12/23 1012)

## 2023-01-12 NOTE — ANESTHESIA PREPROCEDURE EVALUATION
Procedure:  COLONOSCOPY    Relevant Problems   CARDIO   (+) Hyperlipidemia        Physical Exam    Airway      TM Distance: >3 FB  Neck ROM: full     Dental       Cardiovascular  Cardiovascular exam normal    Pulmonary  Pulmonary exam normal     Other Findings        Anesthesia Plan  ASA Score- 2     Anesthesia Type- IV sedation with anesthesia with ASA Monitors  Additional Monitors:   Airway Plan:           Plan Factors-Exercise tolerance (METS): >4 METS  Chart reviewed  EKG reviewed  Imaging results reviewed  Existing labs reviewed  Patient summary reviewed  Induction- intravenous  Postoperative Plan- Plan for postoperative opioid use  Planned trial extubation    Informed Consent- Anesthetic plan and risks discussed with patient  I personally reviewed this patient with the CRNA  Discussed and agreed on the Anesthesia Plan with the CRNA  Evelina Zabala

## 2023-01-16 ENCOUNTER — TELEPHONE (OUTPATIENT)
Dept: GASTROENTEROLOGY | Facility: CLINIC | Age: 68
End: 2023-01-16

## 2023-01-16 NOTE — TELEPHONE ENCOUNTER
----- Message from Severa Chase, MD sent at 1/16/2023 12:08 PM EST -----  Please tell her the polyps were precancerous as expected  Please tell her I will see her at her 6-month colonoscopy

## 2023-02-07 DIAGNOSIS — Z78.0 POST-MENOPAUSAL: ICD-10-CM

## 2023-02-08 DIAGNOSIS — Z12.31 OTHER SCREENING MAMMOGRAM: ICD-10-CM

## 2023-02-10 ENCOUNTER — APPOINTMENT (OUTPATIENT)
Dept: LAB | Facility: CLINIC | Age: 68
End: 2023-02-10

## 2023-02-10 DIAGNOSIS — E78.5 HYPERLIPIDEMIA, UNSPECIFIED HYPERLIPIDEMIA TYPE: ICD-10-CM

## 2023-02-10 LAB
CHOLEST SERPL-MCNC: 219 MG/DL
HDLC SERPL-MCNC: 46 MG/DL
LDLC SERPL CALC-MCNC: 148 MG/DL (ref 0–100)
TRIGL SERPL-MCNC: 123 MG/DL

## 2023-02-15 ENCOUNTER — OFFICE VISIT (OUTPATIENT)
Dept: FAMILY MEDICINE CLINIC | Facility: MEDICAL CENTER | Age: 68
End: 2023-02-15

## 2023-02-15 VITALS
HEIGHT: 66 IN | WEIGHT: 168 LBS | SYSTOLIC BLOOD PRESSURE: 118 MMHG | RESPIRATION RATE: 16 BRPM | HEART RATE: 69 BPM | TEMPERATURE: 99 F | BODY MASS INDEX: 27 KG/M2 | DIASTOLIC BLOOD PRESSURE: 62 MMHG | OXYGEN SATURATION: 99 %

## 2023-02-15 DIAGNOSIS — Z13.1 SCREENING FOR DIABETES MELLITUS: ICD-10-CM

## 2023-02-15 DIAGNOSIS — E78.5 HYPERLIPIDEMIA, UNSPECIFIED HYPERLIPIDEMIA TYPE: ICD-10-CM

## 2023-02-15 DIAGNOSIS — Z00.00 MEDICARE ANNUAL WELLNESS VISIT, SUBSEQUENT: Primary | ICD-10-CM

## 2023-02-15 DIAGNOSIS — Z23 IMMUNIZATION DUE: ICD-10-CM

## 2023-02-15 NOTE — ASSESSMENT & PLAN NOTE
· Counseled about PCV 20 vaccine, agreeable to receiving today  · Counseled about Shingrix vaccination series, tetanus booster, and COVID-19 bivalent vaccine she will receive at pharmacy and space appropriately  · Return to office for well woman exam

## 2023-02-15 NOTE — PROGRESS NOTES
Assessment and Plan:     Problem List Items Addressed This Visit        Other    Hyperlipidemia     I have reviewed pertinent labs:  Lipid Profile:   Lab Results   Component Value Date    CHOLESTEROL 219 (H) 02/10/2023    HDL 46 (L) 02/10/2023    TRIG 123 02/10/2023    LDLCALC 148 (H) 02/10/2023    [place] 189 03/07/2022     · Reviewed lipid panel with patient today in office, she will continue fish oil supplement  · Updated ASCVD risk score 7 2%  · Encourage heart healthy diet and continued regular exercise               Medicare annual wellness visit, subsequent - Primary     · Counseled about PCV 20 vaccine, agreeable to receiving today  · Counseled about Shingrix vaccination series, tetanus booster, and COVID-19 bivalent vaccine she will receive at pharmacy and space appropriately  · Return to office for well woman exam         BMI 27 0-27 9,adult   Other Visit Diagnoses     Immunization due        Relevant Orders    Pneumococcal Conjugate Vaccine 20-valent (Pcv20) (Completed)    Screening for diabetes mellitus        Relevant Orders    Glucose, fasting           Preventive health issues were discussed with patient, and age appropriate screening tests were ordered as noted in patient's After Visit Summary  Personalized health advice and appropriate referrals for health education or preventive services given if needed, as noted in patient's After Visit Summary       History of Present Illness:     Patient presents for a Medicare Wellness Visit    HPI   Patient Care Team:  Brissa Cedillo DO as PCP - General (Family Medicine)  Alek Forrest MD as Endoscopist     Review of Systems:     Review of Systems     As noted in HPI      Problem List:     Patient Active Problem List   Diagnosis   • Status post total replacement of right hip   • Hyperlipidemia   • Second hand smoke exposure   • Wheezing   • Medicare annual wellness visit, subsequent   • BMI 27 0-27 9,adult      Past Medical and Surgical History: Past Medical History:   Diagnosis Date   • Arthritis    • Colon polyp    • Degenerative joint disease of right hip    • High cholesterol     treated with diet changes and fish oil   • Hip pain      Past Surgical History:   Procedure Laterality Date   • COLONOSCOPY     • HYSTERECTOMY     • JOINT REPLACEMENT  04/16/2016   • MO ARTHRP ACETBLR/PROX FEM PROSTC AGRFT/ALGRFT Right 4/18/2016    Procedure: TOTAL HIP ARTHROPLASTY ;  Surgeon: Jeannette Rodríguez MD;  Location: [facility];  Service: Orthopedics   • MO COLONOSCOPY FLX DX W/COLLJ SPEC WHEN PFRMD N/A 2/3/2018    Procedure: COLONOSCOPY;  Surgeon: Amada Gould MD;  Location: [facility]; Service: Gastroenterology   • SHOULDER SURGERY Right     reconstruction for chronic anterior dislocation   • TONSILLECTOMY      with adenoidectomy   • TUBAL LIGATION        Family History:     Family History   Problem Relation Age of Onset   • Hypertension Mother    • Alzheimer's disease Mother    • Dementia Mother    • COPD Father    • Pulmonary fibrosis Father    • Asthma Sister       Social History:     Social History     Socioeconomic History   • Marital status: /Civil Union     Spouse name: None   • Number of children: None   • Years of education: None   • Highest education level: None   Occupational History   • Occupation:      Comment: 30 years   Tobacco Use   • Smoking status: Never   • Smokeless tobacco: Never   • Tobacco comments:     SECOND HAND SMOKE   Vaping Use   • Vaping Use: Never used   Substance and Sexual Activity   • Alcohol use:  Yes     Alcohol/week: 0 0 standard drinks     Comment: occaisional   • Drug use: No   • Sexual activity: Not Currently     Partners: Male     Birth control/protection: Post-menopausal, Surgical   Other Topics Concern   • None   Social History Narrative   • None     Social Determinants of Health     Financial Resource Strain: Low Risk    • Difficulty of Paying Living Expenses: Not very hard   Food Insecurity: Not on file   Transportation Needs: No Transportation Needs   • Lack of Transportation (Medical): No   • Lack of Transportation (Non-Medical): No   Physical Activity: Not on file   Stress: Not on file   Social Connections: Not on file   Intimate Partner Violence: Not on file   Housing Stability: Not on file      Medications and Allergies:     Current Outpatient Medications   Medication Sig Dispense Refill   • fluticasone (FLONASE) 50 mcg/act nasal spray 1 spray into each nostril daily 1 Bottle 3     No current facility-administered medications for this visit  Allergies   Allergen Reactions   • Tramadol GI Intolerance      Immunizations:     Immunization History   Administered Date(s) Administered   • COVID-19 MODERNA VACC 0 5 ML IM 03/20/2021, 04/17/2021, 12/16/2021   • H1N1, All Formulations 01/11/2010   • INFLUENZA 10/01/2021, 11/09/2022   • Influenza, high dose seasonal 0 7 mL 10/13/2020, 11/09/2022   • Pneumococcal Conjugate 13-Valent 09/03/2019   • Pneumococcal Conjugate Vaccine 20-valent (Pcv20), Polysace 02/15/2023   • Pneumococcal Polysaccharide PPV23 02/14/2022      Health Maintenance:         Topic Date Due   • Colorectal Cancer Screening  07/11/2023   • Breast Cancer Screening: Mammogram  02/03/2024   • DXA SCAN  02/03/2025   • Cervical Cancer Screening  03/28/2025   • Hepatitis C Screening  Completed         Topic Date Due   • COVID-19 Vaccine (4 - Booster for Reuel Sandoval series) 02/10/2022      Medicare Screening Tests and Risk Assessments:     Karrin Heimlich is here for her Subsequent Wellness visit  Health Risk Assessment:   Patient rates overall health as very good  Patient feels that their physical health rating is slightly better  Patient is satisfied with their life  Eyesight was rated as same  Hearing was rated as same  Patient feels that their emotional and mental health rating is same  Patients states they are never, rarely angry   Patient states they are sometimes unusually tired/fatigued  Pain experienced in the last 7 days has been some  Patient's pain rating has been 2/10  Patient states that she has experienced no weight loss or gain in last 6 months  2/10 pain-hands, did OT course    Depression Screening:   PHQ-2 Score: 0      Fall Risk Screening: In the past year, patient has experienced: no history of falling in past year      Urinary Incontinence Screening:   Patient has leaked urine accidently in the last six months  Home Safety:  Patient does not have trouble with stairs inside or outside of their home  Patient has working smoke alarms and has working carbon monoxide detector  Home safety hazards include: none  Nutrition:   Current diet is Regular and Limited junk food  Medications:   Patient is currently taking over-the-counter supplements  OTC medications include: see medication list  Patient is able to manage medications  Activities of Daily Living (ADLs)/Instrumental Activities of Daily Living (IADLs):   Walk and transfer into and out of bed and chair?: Yes  Dress and groom yourself?: Yes    Bathe or shower yourself?: Yes    Feed yourself?  Yes  Do your laundry/housekeeping?: Yes  Manage your money, pay your bills and track your expenses?: Yes  Make your own meals?: Yes    Do your own shopping?: Yes    Previous Hospitalizations:   Any hospitalizations or ED visits within the last 12 months?: No      Advance Care Planning:   Living will: No    Durable POA for healthcare: No    Advanced directive: No    Advanced directive counseling given: Yes    Five wishes given: Yes    Patient declined ACP directive: No      Cognitive Screening:   Provider or family/friend/caregiver concerned regarding cognition?: No    PREVENTIVE SCREENINGS      Cardiovascular Screening:    General: History Lipid Disorder      Diabetes Screening:     General: Risks and Benefits Discussed    Due for: Blood Glucose      Colorectal Cancer Screening:     General: Screening Current Breast Cancer Screening:     General: Screening Current      Cervical Cancer Screening:    General: Screening Not Indicated      Osteoporosis Screening:    General: Screening Current      Abdominal Aortic Aneurysm (AAA) Screening:        General: Screening Not Indicated      Lung Cancer Screening:     General: Screening Not Indicated      Hepatitis C Screening:    General: Screening Current      Preventive Screening Comments: Next colonoscopy in 6 months     Screening, Brief Intervention, and Referral to Treatment (SBIRT)    Screening  Typical number of drinks in a day: 0  Typical number of drinks in a week: 0  Interpretation: Low risk drinking behavior  AUDIT-C Screenin) How often did you have a drink containing alcohol in the past year? monthly or less  2) How many drinks did you have on a typical day when you were drinking in the past year? 1 to 2  3) How often did you have 6 or more drinks on one occasion in the past year? never    AUDIT-C Score: 1  Interpretation: Score 0-2 (female): Negative screen for alcohol misuse    Single Item Drug Screening:  How often have you used an illegal drug (including marijuana) or a prescription medication for non-medical reasons in the past year? never    Single Item Drug Screen Score: 0  Interpretation: Negative screen for possible drug use disorder    Other Counseling Topics:   Car/seat belt/driving safety, skin self-exam and sunscreen  Yearly skin checks with Dermatology     Patient is very active, she enjoys skiing, walking her dog, yoga         Physical Exam:     /62 (BP Location: Left arm, Patient Position: Sitting, Cuff Size: Adult)   Pulse 69   Temp 99 °F (37 2 °C)   Resp 16   Ht 5' 6" (1 676 m)   Wt 76 2 kg (168 lb)   LMP  (LMP Unknown)   SpO2 99%   BMI 27 12 kg/m²     Physical Exam  Vitals reviewed  Constitutional:       Appearance: Normal appearance  HENT:      Head: Normocephalic and atraumatic        Nose: Nose normal       Mouth/Throat: Mouth: Mucous membranes are moist       Pharynx: Oropharynx is clear  Eyes:      Extraocular Movements: Extraocular movements intact  Conjunctiva/sclera: Conjunctivae normal       Pupils: Pupils are equal, round, and reactive to light  Cardiovascular:      Rate and Rhythm: Normal rate and regular rhythm  Pulses: Normal pulses  Heart sounds: Normal heart sounds  Pulmonary:      Effort: Pulmonary effort is normal       Breath sounds: Normal breath sounds  Abdominal:      General: Abdomen is flat  Bowel sounds are normal       Palpations: Abdomen is soft  Tenderness: There is no abdominal tenderness  Musculoskeletal:      Cervical back: Neck supple  Right lower leg: No edema  Left lower leg: No edema  Lymphadenopathy:      Cervical: No cervical adenopathy  Skin:     General: Skin is warm and dry  Neurological:      Mental Status: She is alert and oriented to person, place, and time  Psychiatric:         Mood and Affect: Mood normal          Behavior: Behavior normal          Thought Content:  Thought content normal          Judgment: Judgment normal           Neri Kaye DO

## 2023-02-15 NOTE — PATIENT INSTRUCTIONS
Medicare Preventive Visit Patient Instructions  Thank you for completing your Welcome to Medicare Visit or Medicare Annual Wellness Visit today  Your next wellness visit will be due in one year (2/16/2024)  The screening/preventive services that you may require over the next 5-10 years are detailed below  Some tests may not apply to you based off risk factors and/or age  Screening tests ordered at today's visit but not completed yet may show as past due  Also, please note that scanned in results may not display below  Preventive Screenings:  Service Recommendations Previous Testing/Comments   Colorectal Cancer Screening  * Colonoscopy    * Fecal Occult Blood Test (FOBT)/Fecal Immunochemical Test (FIT)  * Fecal DNA/Cologuard Test  * Flexible Sigmoidoscopy Age: 39-70 years old   Colonoscopy: every 10 years (may be performed more frequently if at higher risk)  OR  FOBT/FIT: every 1 year  OR  Cologuard: every 3 years  OR  Sigmoidoscopy: every 5 years  Screening may be recommended earlier than age 39 if at higher risk for colorectal cancer  Also, an individualized decision between you and your healthcare provider will decide whether screening between the ages of 74-80 would be appropriate  Colonoscopy: 01/12/2023  FOBT/FIT: Not on file  Cologuard: Not on file  Sigmoidoscopy: Not on file    Screening Current     Breast Cancer Screening Age: 36 years old  Frequency: every 1-2 years  Not required if history of left and right mastectomy Mammogram: 02/03/2023    Screening Current   Cervical Cancer Screening Between the ages of 21-29, pap smear recommended once every 3 years  Between the ages of 33-67, can perform pap smear with HPV co-testing every 5 years     Recommendations may differ for women with a history of total hysterectomy, cervical cancer, or abnormal pap smears in past  Pap Smear: 03/28/2022    Screening Not Indicated   Hepatitis C Screening Once for adults born between 1945 and 1965  More frequently in patients at high risk for Hepatitis C Hep C Antibody: 03/07/2022    Screening Current   Diabetes Screening 1-2 times per year if you're at risk for diabetes or have pre-diabetes Fasting glucose: 94 mg/dL (3/7/2022)  A1C: No results in last 5 years (No results in last 5 years)  Risks and Benefits Discussed  Due for Blood Glucose   Cholesterol Screening Once every 5 years if you don't have a lipid disorder  May order more often based on risk factors  Lipid panel: 02/10/2023    History Lipid Disorder     Other Preventive Screenings Covered by Medicare:  1  Abdominal Aortic Aneurysm (AAA) Screening: covered once if your at risk  You're considered to be at risk if you have a family history of AAA  2  Lung Cancer Screening: covers low dose CT scan once per year if you meet all of the following conditions: (1) Age 50-69; (2) No signs or symptoms of lung cancer; (3) Current smoker or have quit smoking within the last 15 years; (4) You have a tobacco smoking history of at least 20 pack years (packs per day multiplied by number of years you smoked); (5) You get a written order from a healthcare provider  3  Glaucoma Screening: covered annually if you're considered high risk: (1) You have diabetes OR (2) Family history of glaucoma OR (3)  aged 48 and older OR (3)  American aged 72 and older  3  Osteoporosis Screening: covered every 2 years if you meet one of the following conditions: (1) You're estrogen deficient and at risk for osteoporosis based off medical history and other findings; (2) Have a vertebral abnormality; (3) On glucocorticoid therapy for more than 3 months; (4) Have primary hyperparathyroidism; (5) On osteoporosis medications and need to assess response to drug therapy  · Last bone density test (DXA Scan): 02/03/2023   5  HIV Screening: covered annually if you're between the age of 15-65   Also covered annually if you are younger than 13 and older than 72 with risk factors for HIV infection  For pregnant patients, it is covered up to 3 times per pregnancy  Immunizations:  Immunization Recommendations   Influenza Vaccine Annual influenza vaccination during flu season is recommended for all persons aged >= 6 months who do not have contraindications   Pneumococcal Vaccine   * Pneumococcal conjugate vaccine = PCV13 (Prevnar 13), PCV15 (Vaxneuvance), PCV20 (Prevnar 20)  * Pneumococcal polysaccharide vaccine = PPSV23 (Pneumovax) Adults 25-60 years old: 1-3 doses may be recommended based on certain risk factors  Adults 72 years old: 1-2 doses may be recommended based off what pneumonia vaccine you previously received   Hepatitis B Vaccine 3 dose series if at intermediate or high risk (ex: diabetes, end stage renal disease, liver disease)   Tetanus (Td) Vaccine - COST NOT COVERED BY MEDICARE PART B Following completion of primary series, a booster dose should be given every 10 years to maintain immunity against tetanus  Td may also be given as tetanus wound prophylaxis  Tdap Vaccine - COST NOT COVERED BY MEDICARE PART B Recommended at least once for all adults  For pregnant patients, recommended with each pregnancy  Shingles Vaccine (Shingrix) - COST NOT COVERED BY MEDICARE PART B  2 shot series recommended in those aged 48 and above     Health Maintenance Due:      Topic Date Due   • Colorectal Cancer Screening  07/11/2023   • Breast Cancer Screening: Mammogram  02/03/2024   • DXA SCAN  02/03/2025   • Cervical Cancer Screening  03/28/2025   • Hepatitis C Screening  Completed     Immunizations Due:      Topic Date Due   • COVID-19 Vaccine (4 - Booster for Robin Plush series) 02/10/2022     Advance Directives   What are advance directives? Advance directives are legal documents that state your wishes and plans for medical care  These plans are made ahead of time in case you lose your ability to make decisions for yourself   Advance directives can apply to any medical decision, such as the treatments you want, and if you want to donate organs  What are the types of advance directives? There are many types of advance directives, and each state has rules about how to use them  You may choose a combination of any of the following:  · Living will: This is a written record of the treatment you want  You can also choose which treatments you do not want, which to limit, and which to stop at a certain time  This includes surgery, medicine, IV fluid, and tube feedings  · Durable power of  for healthcare [facility]): This is a written record that states who you want to make healthcare choices for you when you are unable to make them for yourself  This person, called a proxy, is usually a family member or a friend  You may choose more than 1 proxy  · Do not resuscitate (DNR) order:  A DNR order is used in case your heart stops beating or you stop breathing  It is a request not to have certain forms of treatment, such as CPR  A DNR order may be included in other types of advance directives  · Medical directive: This covers the care that you want if you are in a coma, near death, or unable to make decisions for yourself  You can list the treatments you want for each condition  Treatment may include pain medicine, surgery, blood transfusions, dialysis, IV or tube feedings, and a ventilator (breathing machine)  · Values history: This document has questions about your views, beliefs, and how you feel and think about life  This information can help others choose the care that you would choose  Why are advance directives important? An advance directive helps you control your care  Although spoken wishes may be used, it is better to have your wishes written down  Spoken wishes can be misunderstood, or not followed  Treatments may be given even if you do not want them  An advance directive may make it easier for your family to make difficult choices about your care     Urinary Incontinence   Urinary incontinence (UI)  is when you lose control of your bladder  UI develops because your bladder cannot store or empty urine properly  The 3 most common types of UI are stress incontinence, urge incontinence, or both  Medicines:   · May be given to help strengthen your bladder control  Report any side effects of medication to your healthcare provider  Do pelvic muscle exercises often:  Your pelvic muscles help you stop urinating  Squeeze these muscles tight for 5 seconds, then relax for 5 seconds  Gradually work up to squeezing for 10 seconds  Do 3 sets of 15 repetitions a day, or as directed  This will help strengthen your pelvic muscles and improve bladder control  Train your bladder:  Go to the bathroom at set times, such as every 2 hours, even if you do not feel the urge to go  You can also try to hold your urine when you feel the urge to go  For example, hold your urine for 5 minutes when you feel the urge to go  As that becomes easier, hold your urine for 10 minutes  Self-care:   · Keep a UI record  Write down how often you leak urine and how much you leak  Make a note of what you were doing when you leaked urine  · Drink liquids as directed  You may need to limit the amount of liquid you drink to help control your urine leakage  Do not drink any liquid right before you go to bed  Limit or do not have drinks that contain caffeine or alcohol  · Prevent constipation  Eat a variety of high-fiber foods  Good examples are high-fiber cereals, beans, vegetables, and whole-grain breads  Walking is the best way to trigger your intestines to have a bowel movement  · Exercise regularly and maintain a healthy weight  Weight loss and exercise will decrease pressure on your bladder and help you control your leakage  · Use a catheter as directed  to help empty your bladder  A catheter is a tiny, plastic tube that is put into your bladder to drain your urine  · Go to behavior therapy as directed    Behavior therapy may be used to help you learn to control your urge to urinate  Weight Management   Why it is important to manage your weight:  Being overweight increases your risk of health conditions such as heart disease, high blood pressure, type 2 diabetes, and certain types of cancer  It can also increase your risk for osteoarthritis, sleep apnea, and other respiratory problems  Aim for a slow, steady weight loss  Even a small amount of weight loss can lower your risk of health problems  How to lose weight safely:  A safe and healthy way to lose weight is to eat fewer calories and get regular exercise  You can lose up about 1 pound a week by decreasing the number of calories you eat by 500 calories each day  Healthy meal plan for weight management:  A healthy meal plan includes a variety of foods, contains fewer calories, and helps you stay healthy  A healthy meal plan includes the following:  · Eat whole-grain foods more often  A healthy meal plan should contain fiber  Fiber is the part of grains, fruits, and vegetables that is not broken down by your body  Whole-grain foods are healthy and provide extra fiber in your diet  Some examples of whole-grain foods are whole-wheat breads and pastas, oatmeal, brown rice, and bulgur  · Eat a variety of vegetables every day  Include dark, leafy greens such as spinach, kale, doyle greens, and mustard greens  Eat yellow and orange vegetables such as carrots, sweet potatoes, and winter squash  · Eat a variety of fruits every day  Choose fresh or canned fruit (canned in its own juice or light syrup) instead of juice  Fruit juice has very little or no fiber  · Eat low-fat dairy foods  Drink fat-free (skim) milk or 1% milk  Eat fat-free yogurt and low-fat cottage cheese  Try low-fat cheeses such as mozzarella and other reduced-fat cheeses  · Choose meat and other protein foods that are low in fat  Choose beans or other legumes such as split peas or lentils   Choose fish, skinless poultry (chicken or turkey), or lean cuts of red meat (beef or pork)  Before you cook meat or poultry, cut off any visible fat  · Use less fat and oil  Try baking foods instead of frying them  Add less fat, such as margarine, sour cream, regular salad dressing and mayonnaise to foods  Eat fewer high-fat foods  Some examples of high-fat foods include french fries, doughnuts, ice cream, and cakes  · Eat fewer sweets  Limit foods and drinks that are high in sugar  This includes candy, cookies, regular soda, and sweetened drinks  Exercise:  Exercise at least 30 minutes per day on most days of the week  Some examples of exercise include walking, biking, dancing, and swimming  You can also fit in more physical activity by taking the stairs instead of the elevator or parking farther away from stores  Ask your healthcare provider about the best exercise plan for you  © Copyright [employer] 2018 Information is for End User's use only and may not be sold, redistributed or otherwise used for commercial purposes   All illustrations and images included in CareNotes® are the copyrighted property of [employer]  or [address] [place] [address]

## 2023-02-15 NOTE — ASSESSMENT & PLAN NOTE
I have reviewed pertinent labs:  Lipid Profile:   Lab Results   Component Value Date    CHOLESTEROL 219 (H) 02/10/2023    HDL 46 (L) 02/10/2023    TRIG 123 02/10/2023    LDLCALC 148 (H) 02/10/2023    [place] 189 03/07/2022     · Reviewed lipid panel with patient today in office, she will continue fish oil supplement  · Updated ASCVD risk score 7 2%  · Encourage heart healthy diet and continued regular exercise

## 2023-04-16 PROBLEM — Z00.00 MEDICARE ANNUAL WELLNESS VISIT, SUBSEQUENT: Status: RESOLVED | Noted: 2023-02-15 | Resolved: 2023-04-16

## 2023-09-20 ENCOUNTER — TELEPHONE (OUTPATIENT)
Age: 68
End: 2023-09-20

## 2023-09-20 ENCOUNTER — APPOINTMENT (OUTPATIENT)
Dept: LAB | Facility: CLINIC | Age: 68
End: 2023-09-20
Payer: MEDICARE

## 2023-09-20 DIAGNOSIS — Z13.1 SCREENING FOR DIABETES MELLITUS: ICD-10-CM

## 2023-09-20 PROCEDURE — 36415 COLL VENOUS BLD VENIPUNCTURE: CPT

## 2023-09-20 PROCEDURE — 82947 ASSAY GLUCOSE BLOOD QUANT: CPT

## 2023-09-20 NOTE — TELEPHONE ENCOUNTER
Reason for call: Patient needs a refill on her prophylactic antibiotic. She has multiple Dentist appts coming up and request 40 caps. The previous prescription . Please place a new order if appropriate        [x] Refill   [] Prior Auth  [] Other:     Office:   [] PCP/Provider -   [x] Speciality/Provider - [facility]  Medication: Cephalexin      Dose/Frequency: 500mg 4 caps Prior to procedure    Quantity: 40    Pharmacy: [facility]    Does the patient have enough for 3 days?    [] Yes   [x] No - Send as HP to POD

## 2023-09-21 LAB — GLUCOSE P FAST SERPL-MCNC: 80 MG/DL (ref 65–99)

## 2023-09-21 NOTE — PROGRESS NOTES
[facility] - Clinic Note  Gina Cheng [place], 23     Davion Neville MRN: [id number] : 1955 Age: 75 y.o. Assessment/Plan     1. Well woman exam    -Status post hysterectomy  -2/3/2023 mammogram no mammographic evidence of malignancy   2/3/2023 DXA scan The patient has normal bone density as determined by WHO criteria. A repeat bone density should be considered. 2023 colonoscopy:  IMPRESSION:  1 large multilobulated hepatic flexure polyp status post submucosal saline injection and hot snare piecemeal polypectomy  1 splenic flexure polyp status post cold snare polypectomy  To Tj Edward:    Await pathology results     Repeat colonoscopy in 6 months     • Personal history of colon polyps      -Patient overdue for repeat colon cancer screening, guaiac result negative today in office  -Next well woman exam in 1 year    2. Colon cancer screening    - Ambulatory Referral to Gastroenterology; Future    Davion Neville acknowledged understanding of treatment plan, all questions answered. Subjective      Davion Neville is a 75 y.o. female who presents for annual exam. The patient has no complaints today. The patient is not currently sexually active. GYN screening history: s/p total hysterectomy "they left one ovary" and last mammogram was normal. The patient is not taking hormone replacement therapy. The patient participates in regular exercise: yes. The patient reports that there is not domestic violence in her life.      The following portions of the patient's history were reviewed and updated as appropriate: allergies, current medications, past family history, past medical history, past social history, past surgical history and problem list.     Past Medical History:   Diagnosis Date   • Arthritis    • Colon polyp    • Degenerative joint disease of right hip    • High cholesterol     treated with diet changes and fish oil   • Hip pain        Allergies   Allergen Reactions   • Tramadol GI Intolerance       Past Surgical History:   Procedure Laterality Date   • COLONOSCOPY     • HYSTERECTOMY     • JOINT REPLACEMENT  04/16/2016   • [place] ARTHRP ACETBLR/PROX FEM PROSTC AGRFT/ALGRFT Right 4/18/2016    Procedure: TOTAL HIP ARTHROPLASTY ;  Surgeon: Bao Maciel MD;  [facility];  Service: Orthopedics   • UT COLONOSCOPY FLX DX W/COLLJ SPEC WHEN PFRMD N/A 2/3/2018    Procedure: COLONOSCOPY;  Surgeon: Alvin Mooney MD;  Location: [facility]; Service: Gastroenterology   • SHOULDER SURGERY Right     reconstruction for chronic anterior dislocation   • TONSILLECTOMY      with adenoidectomy   • TUBAL LIGATION         Family History   Problem Relation Age of Onset   • Hypertension Mother    • Alzheimer's disease Mother    • Dementia Mother    • COPD Father    • Pulmonary fibrosis Father    • Asthma Sister        Social History     Socioeconomic History   • Marital status: /Civil Union     Spouse name: None   • Number of children: None   • Years of education: None   • Highest education level: None   Occupational History   • Occupation:      Comment: 30 years   Tobacco Use   • Smoking status: Never   • Smokeless tobacco: Never   • Tobacco comments:     SECOND HAND SMOKE   Vaping Use   • Vaping Use: Never used   Substance and Sexual Activity   • Alcohol use:  Yes     Alcohol/week: 0.0 standard drinks of alcohol     Comment: occaisional   • Drug use: No   • Sexual activity: Not Currently     Partners: Male     Birth control/protection: Post-menopausal, Surgical   Other Topics Concern   • None   Social History Narrative   • None     Social Determinants of Health     Financial Resource Strain: Low Risk  (2/15/2023)    Overall Financial Resource Strain (CARDIA)    • Difficulty of Paying Living Expenses: Not very hard   Food Insecurity: Not on file   Transportation Needs: No Transportation Needs (2/15/2023)    PRAPARE - Transportation    • Lack of Transportation (Medical): No    • Lack of Transportation (Non-Medical): No   Physical Activity: Not on file   Stress: Not on file   Social Connections: Not on file   Intimate Partner Violence: Not on file   Housing Stability: Not on file       Current Outpatient Medications   Medication Sig Dispense Refill   • fluticasone (FLONASE) 50 mcg/act nasal spray 1 spray into each nostril daily 1 Bottle 3     No current facility-administered medications for this visit. Review of Systems     As noted in HPI    Objective      /78 (BP Location: Left arm, Patient Position: Sitting, Cuff Size: Large)   Pulse 80   Temp 97.8 °F (36.6 °C)   Ht 5' 6" (1.676 m)   Wt 77.1 kg (170 lb)   LMP  (LMP Unknown)   SpO2 98%   BMI 27.44 kg/m²     Physical Exam  Exam conducted with a chaperone present Kathy Limon MA). Chest:   Breasts:     Right: No swelling, bleeding, inverted nipple, mass, nipple discharge, skin change or tenderness. Left: No swelling, bleeding, inverted nipple, mass, nipple discharge, skin change or tenderness. Genitourinary:     Vagina: No signs of injury. No tenderness or lesions. Uterus: Absent. Adnexa:         Right: No mass, tenderness or fullness. Left: No mass, tenderness or fullness. Rectum: Guaiac result negative. Lymphadenopathy:      Upper Body:      Right upper body: No supraclavicular, axillary or pectoral adenopathy. Left upper body: No supraclavicular, axillary or pectoral adenopathy. Some portions of this record may have been generated with voice recognition software. There may be translation, syntax, or grammatical errors. Occasional wrong word or "sound-a-like" substitutions may have occurred due to the inherent limitations of the voice recognition software. Read the chart carefully and recognize, using context, where substations may have occurred.  If you have any questions, please contact the dictating provider for clarification or correction, as needed.

## 2023-09-22 ENCOUNTER — OFFICE VISIT (OUTPATIENT)
Dept: FAMILY MEDICINE CLINIC | Facility: MEDICAL CENTER | Age: 68
End: 2023-09-22

## 2023-09-22 VITALS
TEMPERATURE: 97.8 F | HEIGHT: 66 IN | SYSTOLIC BLOOD PRESSURE: 122 MMHG | OXYGEN SATURATION: 98 % | DIASTOLIC BLOOD PRESSURE: 78 MMHG | BODY MASS INDEX: 27.32 KG/M2 | HEART RATE: 80 BPM | WEIGHT: 170 LBS

## 2023-09-22 DIAGNOSIS — Z12.11 COLON CANCER SCREENING: ICD-10-CM

## 2023-09-22 DIAGNOSIS — Z01.419 WELL WOMAN EXAM: Primary | ICD-10-CM

## 2023-09-27 ENCOUNTER — OFFICE VISIT (OUTPATIENT)
Dept: FAMILY MEDICINE CLINIC | Facility: MEDICAL CENTER | Age: 68
End: 2023-09-27
Payer: MEDICARE

## 2023-09-27 VITALS
SYSTOLIC BLOOD PRESSURE: 118 MMHG | DIASTOLIC BLOOD PRESSURE: 78 MMHG | TEMPERATURE: 97.6 F | OXYGEN SATURATION: 97 % | WEIGHT: 170.4 LBS | HEART RATE: 82 BPM | HEIGHT: 66 IN | BODY MASS INDEX: 27.38 KG/M2

## 2023-09-27 DIAGNOSIS — Z23 ENCOUNTER FOR IMMUNIZATION: ICD-10-CM

## 2023-09-27 DIAGNOSIS — L98.9 LESION OF SUBCUTANEOUS TISSUE: Primary | ICD-10-CM

## 2023-09-27 DIAGNOSIS — M79.672 FOOT PAIN, LEFT: ICD-10-CM

## 2023-09-27 PROCEDURE — 99214 OFFICE O/P EST MOD 30 MIN: CPT | Performed by: STUDENT IN AN ORGANIZED HEALTH CARE EDUCATION/TRAINING PROGRAM

## 2023-09-27 PROCEDURE — 90662 IIV NO PRSV INCREASED AG IM: CPT

## 2023-09-27 PROCEDURE — G0008 ADMIN INFLUENZA VIRUS VAC: HCPCS

## 2023-09-27 NOTE — PROGRESS NOTES
[facility] - Clinic Note  Julito [place], 22     Tee Cárdenas MRN: [id number] : 1955 Age: 75 y.o. Assessment/Plan     1. Lesion of subcutaneous tissue    -Patient with subcutaneous lesion anterior aspect left ankle that is causing her pain especially with plantarflexion, will check ultrasound and review, consider surgical referral if appropriate  - US MSK limited; Future    2. Foot pain, left    - US MSK limited; Future    3. Encounter for immunization    - influenza vaccine, high-dose, PF 0.7 mL (FLUZONE HIGH-DOSE)    Tee Cárdenas acknowledged understanding of treatment plan, all questions answered. Subjective      Tee Cárdenas is a 75 y.o. female who presents complaining of pain. "Particualrly this left foot, developed this lump". Patient describes it hurts to plantarflex her left foot and pain is especially worse at night. Does not recall any injury. She notices some related swelling that is present all day. She has not noticed any related erythema. Patient mention she has a small lump right wrist that has been present there for years.     The following portions of the patient's history were reviewed and updated as appropriate: allergies, current medications, past family history, past medical history, past social history, past surgical history and problem list.     Past Medical History:   Diagnosis Date   • Arthritis    • Colon polyp    • Degenerative joint disease of right hip    • High cholesterol     treated with diet changes and fish oil   • Hip pain        Allergies   Allergen Reactions   • Tramadol GI Intolerance       Past Surgical History:   Procedure Laterality Date   • COLONOSCOPY     • HYSTERECTOMY     • JOINT REPLACEMENT  2016   • MD ARTHRP ACETBLR/PROX FEM PROSTC AGRFT/ALGRFT Right 2016    Procedure: TOTAL HIP ARTHROPLASTY ;  Surgeon: Mary White MD;  [facility];  Service: Orthopedics   • MD COLONOSCOPY FLX DX W/COLLJ SPEC WHEN PFRMD N/A 2/3/2018    Procedure: COLONOSCOPY;  Surgeon: Rolo Umana MD;  [facility]; Service: Gastroenterology   • SHOULDER SURGERY Right     reconstruction for chronic anterior dislocation   • TONSILLECTOMY      with adenoidectomy   • TUBAL LIGATION         Family History   Problem Relation Age of Onset   • Hypertension Mother    • Alzheimer's disease Mother    • Dementia Mother    • COPD Father    • Pulmonary fibrosis Father    • Asthma Sister        Social History     Socioeconomic History   • Marital status: /Civil Union     Spouse name: None   • Number of children: None   • Years of education: None   • Highest education level: None   Occupational History   • Occupation:      Comment: 30 years   Tobacco Use   • Smoking status: Never   • Smokeless tobacco: Never   • Tobacco comments:     SECOND HAND SMOKE   Vaping Use   • Vaping Use: Never used   Substance and Sexual Activity   • Alcohol use: Yes     Alcohol/week: 0.0 standard drinks of alcohol     Comment: occaisional   • Drug use: No   • Sexual activity: Not Currently     Partners: Male     Birth control/protection: Post-menopausal, Surgical   Other Topics Concern   • None   Social History Narrative   • None     Social Determinants of Health     Financial Resource Strain: Low Risk  (2/15/2023)    Overall Financial Resource Strain (CARDIA)    • Difficulty of Paying Living Expenses: Not very hard   Food Insecurity: Not on file   Transportation Needs: No Transportation Needs (2/15/2023)    PRAPARE - Transportation    • Lack of Transportation (Medical): No    • Lack of Transportation (Non-Medical):  No   Physical Activity: Not on file   Stress: Not on file   Social Connections: Not on file   Intimate Partner Violence: Not on file   Housing Stability: Not on file       Current Outpatient Medications   Medication Sig Dispense Refill   • fluticasone (FLONASE) 50 mcg/act nasal spray 1 spray into each nostril daily 1 Bottle 3 No current facility-administered medications for this visit. Review of Systems     As noted in HPI    Objective      /78 (BP Location: Left arm, Patient Position: Sitting, Cuff Size: Large)   Pulse 82   Temp 97.6 °F (36.4 °C)   Ht 5' 6" (1.676 m)   Wt 77.3 kg (170 lb 6.4 oz)   LMP  (LMP Unknown)   SpO2 97%   BMI 27.50 kg/m²     Physical Exam  Vitals reviewed. Constitutional:       General: She is not in acute distress. Appearance: Normal appearance. HENT:      Head: Normocephalic and atraumatic. Eyes:      Conjunctiva/sclera: Conjunctivae normal.   Cardiovascular:      Pulses:           Dorsalis pedis pulses are 2+ on the right side and 2+ on the left side. Posterior tibial pulses are 2+ on the right side and 2+ on the left side. Pulmonary:      Effort: Pulmonary effort is normal.   Musculoskeletal:      Left ankle: Normal range of motion. Right foot: Normal range of motion. Left foot: Normal range of motion. Legs:       Comments: Round area localized swelling anterior aspect left ankle, soft, no related erythema or tactile warmth   Feet:      Right foot:      Skin integrity: Skin integrity normal. No ulcer, blister, skin breakdown, erythema, warmth, callus, dry skin or fissure. Toenail Condition: Fungal disease present. Left foot:      Skin integrity: Skin integrity normal. No ulcer, blister, skin breakdown, erythema, warmth, callus, dry skin or fissure. Toenail Condition: Fungal disease present. Skin:     General: Skin is warm and dry. Neurological:      Mental Status: She is alert and oriented to person, place, and time. Psychiatric:         Mood and Affect: Mood normal.         Behavior: Behavior normal.         Thought Content: Thought content normal.         Judgment: Judgment normal.             Some portions of this record may have been generated with voice recognition software.  There may be translation, syntax, or grammatical errors. Occasional wrong word or "sound-a-like" substitutions may have occurred due to the inherent limitations of the voice recognition software. Read the chart carefully and recognize, using context, where substations may have occurred. If you have any questions, please contact the dictating provider for clarification or correction, as needed.

## 2023-10-10 ENCOUNTER — HOSPITAL ENCOUNTER (OUTPATIENT)
Dept: ULTRASOUND IMAGING | Facility: HOSPITAL | Age: 68
Discharge: HOME | End: 2023-10-10
Payer: MEDICARE

## 2023-10-10 DIAGNOSIS — M79.672 FOOT PAIN, LEFT: ICD-10-CM

## 2023-10-10 DIAGNOSIS — L98.9 LESION OF SUBCUTANEOUS TISSUE: ICD-10-CM

## 2023-10-10 PROCEDURE — 76882 US LMTD JT/FCL EVL NVASC XTR: CPT

## 2023-10-13 DIAGNOSIS — M79.89 MASS OF SOFT TISSUE OF ANKLE: ICD-10-CM

## 2023-10-13 DIAGNOSIS — M79.672 FOOT PAIN, LEFT: ICD-10-CM

## 2023-10-13 DIAGNOSIS — L98.9 LESION OF SUBCUTANEOUS TISSUE: Primary | ICD-10-CM

## 2023-10-16 ENCOUNTER — PREP FOR PROCEDURE (OUTPATIENT)
Dept: GASTROENTEROLOGY | Facility: CLINIC | Age: 68
End: 2023-10-16

## 2023-10-16 DIAGNOSIS — Z86.010 HISTORY OF COLON POLYPS: Primary | ICD-10-CM

## 2023-10-16 DIAGNOSIS — Z12.11 SCREENING FOR COLON CANCER: ICD-10-CM

## 2023-10-17 ENCOUNTER — TELEPHONE (OUTPATIENT)
Age: 68
End: 2023-10-17

## 2023-10-17 NOTE — TELEPHONE ENCOUNTER
No is scheduled to have a MRI with contrast 11/3/23 and need lab orders entered to included BUN, Creatnine

## 2023-10-18 DIAGNOSIS — Z13.89 SCREENING FOR NEPHROPATHY: Primary | ICD-10-CM

## 2023-10-26 ENCOUNTER — APPOINTMENT (OUTPATIENT)
Dept: LAB | Facility: CLINIC | Age: 68
End: 2023-10-26
Payer: MEDICARE

## 2023-10-26 DIAGNOSIS — Z13.89 SCREENING FOR NEPHROPATHY: ICD-10-CM

## 2023-10-26 LAB
ANION GAP SERPL CALCULATED.3IONS-SCNC: 9 MMOL/L
BUN SERPL-MCNC: 19 MG/DL (ref 5–25)
CALCIUM SERPL-MCNC: 9.2 MG/DL (ref 8.4–10.2)
CHLORIDE SERPL-SCNC: 106 MMOL/L (ref 96–108)
CO2 SERPL-SCNC: 28 MMOL/L (ref 21–32)
CREAT SERPL-MCNC: 0.8 MG/DL (ref 0.6–1.3)
GFR SERPL CREATININE-BSD FRML MDRD: 75 ML/MIN/1.73SQ M
GLUCOSE P FAST SERPL-MCNC: 78 MG/DL (ref 65–99)
POTASSIUM SERPL-SCNC: 4 MMOL/L (ref 3.5–5.3)
SODIUM SERPL-SCNC: 143 MMOL/L (ref 135–147)

## 2023-10-26 PROCEDURE — 36415 COLL VENOUS BLD VENIPUNCTURE: CPT

## 2023-10-26 PROCEDURE — 80048 BASIC METABOLIC PNL TOTAL CA: CPT

## 2023-10-27 ENCOUNTER — TELEPHONE (OUTPATIENT)
Dept: GASTROENTEROLOGY | Facility: CLINIC | Age: 68
End: 2023-10-27

## 2023-10-30 ENCOUNTER — TELEPHONE (OUTPATIENT)
Dept: FAMILY MEDICINE CLINIC | Facility: MEDICAL CENTER | Age: 68
End: 2023-10-30

## 2023-10-30 NOTE — TELEPHONE ENCOUNTER
----- Message from Oumar Beal DO sent at 10/30/2023  9:09 AM EDT -----  Please inform patient that BMP returned with no electrolyte abnormalities, normal fasting blood sugar and stable kidney function.

## 2023-10-30 NOTE — TELEPHONE ENCOUNTER
Voicemail left for the patient with the information below, patient was advised to call the office with any questions or concerns.

## 2023-11-03 ENCOUNTER — HOSPITAL ENCOUNTER (OUTPATIENT)
Dept: MRI IMAGING | Facility: HOSPITAL | Age: 68
End: 2023-11-03
Payer: MEDICARE

## 2023-11-03 DIAGNOSIS — M79.672 FOOT PAIN, LEFT: ICD-10-CM

## 2023-11-03 DIAGNOSIS — M79.89 MASS OF SOFT TISSUE OF ANKLE: ICD-10-CM

## 2023-11-03 PROCEDURE — A9585 GADOBUTROL INJECTION: HCPCS | Performed by: STUDENT IN AN ORGANIZED HEALTH CARE EDUCATION/TRAINING PROGRAM

## 2023-11-03 PROCEDURE — G1004 CDSM NDSC: HCPCS

## 2023-11-03 PROCEDURE — 73723 MRI JOINT LWR EXTR W/O&W/DYE: CPT

## 2023-11-03 RX ORDER — GADOBUTROL 604.72 MG/ML
7 INJECTION INTRAVENOUS
Status: COMPLETED | OUTPATIENT
Start: 2023-11-03 | End: 2023-11-03

## 2023-11-03 RX ADMIN — GADOBUTROL 7 ML: 604.72 INJECTION INTRAVENOUS at 08:33

## 2023-11-07 ENCOUNTER — TELEPHONE (OUTPATIENT)
Dept: FAMILY MEDICINE CLINIC | Facility: MEDICAL CENTER | Age: 68
End: 2023-11-07

## 2023-11-07 DIAGNOSIS — M67.40 GANGLION CYST: Primary | ICD-10-CM

## 2023-11-07 DIAGNOSIS — M67.479 GANGLION CYST OF FOOT: ICD-10-CM

## 2023-11-07 NOTE — TELEPHONE ENCOUNTER
Patient is aware and understands instructions. She got a call from ortho earlier and already has an appt set up for next week.

## 2023-11-07 NOTE — TELEPHONE ENCOUNTER
----- Message from Chanda Alvarez DO sent at 11/7/2023 12:17 PM EST -----  Please inform patient that MRI returned describing that mass likely consistent with ganglion cyst (fluid-filled sac) and is some tendinosis which is some degeneration of the tendon from likely overuse. I recommend consultation with orthopedic surgery foot and ankle. Referral order placed.

## 2023-11-10 ENCOUNTER — ANESTHESIA (OUTPATIENT)
Dept: GASTROENTEROLOGY | Facility: HOSPITAL | Age: 68
End: 2023-11-10

## 2023-11-10 ENCOUNTER — HOSPITAL ENCOUNTER (OUTPATIENT)
Dept: GASTROENTEROLOGY | Facility: HOSPITAL | Age: 68
Setting detail: OUTPATIENT SURGERY
End: 2023-11-10
Attending: INTERNAL MEDICINE
Payer: MEDICARE

## 2023-11-10 ENCOUNTER — ANESTHESIA EVENT (OUTPATIENT)
Dept: GASTROENTEROLOGY | Facility: HOSPITAL | Age: 68
End: 2023-11-10

## 2023-11-10 VITALS
SYSTOLIC BLOOD PRESSURE: 115 MMHG | TEMPERATURE: 98 F | OXYGEN SATURATION: 100 % | HEIGHT: 66 IN | HEART RATE: 58 BPM | DIASTOLIC BLOOD PRESSURE: 72 MMHG | BODY MASS INDEX: 26.93 KG/M2 | WEIGHT: 167.55 LBS | RESPIRATION RATE: 13 BRPM

## 2023-11-10 DIAGNOSIS — Z12.11 SCREENING FOR COLON CANCER: ICD-10-CM

## 2023-11-10 DIAGNOSIS — Z86.010 HISTORY OF COLON POLYPS: ICD-10-CM

## 2023-11-10 PROCEDURE — 88305 TISSUE EXAM BY PATHOLOGIST: CPT | Performed by: STUDENT IN AN ORGANIZED HEALTH CARE EDUCATION/TRAINING PROGRAM

## 2023-11-10 PROCEDURE — 45385 COLONOSCOPY W/LESION REMOVAL: CPT | Performed by: INTERNAL MEDICINE

## 2023-11-10 RX ORDER — SODIUM CHLORIDE, SODIUM LACTATE, POTASSIUM CHLORIDE, CALCIUM CHLORIDE 600; 310; 30; 20 MG/100ML; MG/100ML; MG/100ML; MG/100ML
INJECTION, SOLUTION INTRAVENOUS CONTINUOUS PRN
Status: DISCONTINUED | OUTPATIENT
Start: 2023-11-10 | End: 2023-11-10

## 2023-11-10 RX ORDER — PROPOFOL 10 MG/ML
INJECTION, EMULSION INTRAVENOUS AS NEEDED
Status: DISCONTINUED | OUTPATIENT
Start: 2023-11-10 | End: 2023-11-10

## 2023-11-10 RX ADMIN — PROPOFOL 30 MG: 10 INJECTION, EMULSION INTRAVENOUS at 12:05

## 2023-11-10 RX ADMIN — PROPOFOL 100 MG: 10 INJECTION, EMULSION INTRAVENOUS at 12:01

## 2023-11-10 RX ADMIN — PROPOFOL 30 MG: 10 INJECTION, EMULSION INTRAVENOUS at 12:17

## 2023-11-10 RX ADMIN — PROPOFOL 30 MG: 10 INJECTION, EMULSION INTRAVENOUS at 12:09

## 2023-11-10 RX ADMIN — PROPOFOL 30 MG: 10 INJECTION, EMULSION INTRAVENOUS at 12:13

## 2023-11-10 RX ADMIN — SODIUM CHLORIDE, SODIUM LACTATE, POTASSIUM CHLORIDE, AND CALCIUM CHLORIDE: .6; .31; .03; .02 INJECTION, SOLUTION INTRAVENOUS at 11:43

## 2023-11-10 NOTE — H&P
History and Physical - [facility]  Soraya Gallagher 75 y.o. female MRN: [id number]                  HPI: Soraya Gallagher is a 75y.o. year old female who presents for colonoscopy for large feels female polypectomy      REVIEW OF SYSTEMS: Per the HPI, and otherwise unremarkable. Historical Information   Past Medical History:   Diagnosis Date    Arthritis     Colon polyp     Degenerative joint disease of right hip     High cholesterol     treated with diet changes and fish oil    Hip pain      Past Surgical History:   Procedure Laterality Date    COLONOSCOPY      HYSTERECTOMY      JOINT REPLACEMENT  04/16/2016    [place] ARTHRP ACETBLR/PROX FEM PROSTC AGRFT/ALGRFT Right 4/18/2016    Procedure: TOTAL HIP ARTHROPLASTY ;  Surgeon: Lashaun Self MD;  [facility];  Service: Orthopedics    [place] COLONOSCOPY FLX DX W/COLLJ SPEC WHEN PFRMD N/A 2/3/2018    Procedure: COLONOSCOPY;  Surgeon: Triny Lewis MD;  [facility]: [facility]; Service: Gastroenterology    SHOULDER SURGERY Right     reconstruction for chronic anterior dislocation    TONSILLECTOMY      with adenoidectomy    TUBAL LIGATION       Social History   Social History     Substance and Sexual Activity   Alcohol Use Yes    Alcohol/week: 0.0 standard drinks of alcohol    Comment: occaisional     Social History     Substance and Sexual Activity   Drug Use No     Social History     Tobacco Use   Smoking Status Never   Smokeless Tobacco Never   Tobacco Comments    SECOND HAND SMOKE     Family History   Problem Relation Age of Onset    Hypertension Mother     Alzheimer's disease Mother     Dementia Mother     COPD Father     Pulmonary fibrosis Father     Asthma Sister        Meds/Allergies     (Not in a hospital admission)      Allergies   Allergen Reactions    Tramadol GI Intolerance       Objective     Blood pressure 148/63, pulse 70, temperature 97.6 °F (36.4 °C), temperature source Temporal, resp.  rate 15, height 5' 6" (1.676 m), weight 76 kg (167 lb 8.8 oz), SpO2 100 %. PHYSICAL EXAM    /63   Pulse 70   Temp 97.6 °F (36.4 °C) (Temporal)   Resp 15   Ht 5' 6" (1.676 m)   Wt 76 kg (167 lb 8.8 oz)   LMP  (LMP Unknown)   SpO2 100%   BMI 27.04 kg/m²       Gen: NAD  CV: RRR  CHEST: Clear  ABD: soft, NT/ND  EXT: no edema      ASSESSMENT/PLAN:  This is a 75y.o. year old female here for colonoscopy, and she is stable and optimized for her procedure.

## 2023-11-10 NOTE — ANESTHESIA PREPROCEDURE EVALUATION
Procedure:  COLONOSCOPY    Relevant Problems   ANESTHESIA (within normal limits)      CARDIO   (+) Hyperlipidemia   (-) Angina at rest   (-) DOSS (dyspnea on exertion)      ENDO (within normal limits)      GI/HEPATIC (within normal limits)  NPO confirmed  BMI 27   (-) Gastroesophageal reflux disease      /RENAL (within normal limits)      PULMONARY   (-) URI (upper respiratory infection)      Allergies   Allergen Reactions    Tramadol GI Intolerance     Social History     Tobacco Use    Smoking status: Never    Smokeless tobacco: Never    Tobacco comments:     SECOND HAND SMOKE   Vaping Use    Vaping Use: Never used   Substance Use Topics    Alcohol use: Yes     Alcohol/week: 0.0 standard drinks of alcohol     Comment: occaisional    Drug use: No     Current Outpatient Medications   Medication Instructions    fluticasone (FLONASE) 50 mcg/act nasal spray 1 spray, Nasal, Daily     Lab Results   Component Value Date    WBC 4.99 03/07/2022    HGB 13.7 03/07/2022    HCT 43.3 03/07/2022     03/07/2022    SODIUM 143 10/26/2023    K 4.0 10/26/2023     10/26/2023    CO2 28 10/26/2023    BUN 19 10/26/2023    CREATININE 0.80 10/26/2023    GLUC 102 04/21/2016    HGBA1C 5.5 01/11/2018    AST 14 03/07/2022    ALT 27 03/07/2022    ALKPHOS 82 03/07/2022    TBILI 0.56 03/07/2022    ALB 3.8 03/07/2022     Vitals:    11/10/23 1044   BP: 148/63   Pulse: 70   Resp: 15   Temp: 97.6 °F (36.4 °C)   SpO2: 100%       Physical Exam    Airway    Mallampati score: II  TM Distance: >3 FB  Neck ROM: full     Dental   Comment: Denies loose/chipped teeth, No notable dental hx     Cardiovascular  Rhythm: regular, Rate: normal, Cardiovascular exam normal    Pulmonary  Pulmonary exam normal Breath sounds clear to auscultation    Other Findings        Anesthesia Plan  ASA Score- 2     Anesthesia Type- IV sedation with anesthesia with ASA Monitors.          Additional Monitors:     Airway Plan:     Comment: O2 mask, natural airway, EtCO2 monitor. Risks discussed including awareness, aspiration, drug reactions and conversion to GA. Tip Costa Plan Factors-Exercise tolerance (METS): >4 METS. Chart reviewed. EKG reviewed. Imaging results reviewed. Existing labs reviewed. Patient summary reviewed. Patient is not a current smoker. Induction- intravenous. Postoperative Plan-     Informed Consent- Anesthetic plan and risks discussed with patient. I personally reviewed this patient with the CRNA. Discussed and agreed on the Anesthesia Plan with the CRNA. Tip Costa

## 2023-11-10 NOTE — ANESTHESIA POSTPROCEDURE EVALUATION
Post-Op Assessment Note    CV Status:  Stable    Pain management: adequate     Mental Status:  Sleepy   Hydration Status:  Euvolemic   PONV Controlled:  Controlled   Airway Patency:  Patent      Post Op Vitals Reviewed: Yes      Staff: CRNA         No notable events documented.     BP   124/56   Temp   97.7   Pulse  70   Resp   18   SpO2   99

## 2023-11-14 ENCOUNTER — APPOINTMENT (OUTPATIENT)
Dept: RADIOLOGY | Facility: AMBULARY SURGERY CENTER | Age: 68
End: 2023-11-14
Attending: ORTHOPAEDIC SURGERY
Payer: MEDICARE

## 2023-11-14 ENCOUNTER — OFFICE VISIT (OUTPATIENT)
Dept: OBGYN CLINIC | Facility: CLINIC | Age: 68
End: 2023-11-14
Payer: MEDICARE

## 2023-11-14 DIAGNOSIS — M67.479 GANGLION CYST OF FOOT: Primary | ICD-10-CM

## 2023-11-14 DIAGNOSIS — M67.479 GANGLION CYST OF FOOT: ICD-10-CM

## 2023-11-14 PROCEDURE — 99214 OFFICE O/P EST MOD 30 MIN: CPT | Performed by: ORTHOPAEDIC SURGERY

## 2023-11-14 PROCEDURE — 73600 X-RAY EXAM OF ANKLE: CPT

## 2023-11-14 PROCEDURE — 88305 TISSUE EXAM BY PATHOLOGIST: CPT | Performed by: STUDENT IN AN ORGANIZED HEALTH CARE EDUCATION/TRAINING PROGRAM

## 2023-11-14 PROCEDURE — 73610 X-RAY EXAM OF ANKLE: CPT

## 2023-11-14 PROCEDURE — 20600 DRAIN/INJ JOINT/BURSA W/O US: CPT | Performed by: ORTHOPAEDIC SURGERY

## 2023-11-14 NOTE — PROGRESS NOTES
Aaron Murillo M.D. Attending, Orthopaedic Surgery  Foot and [address] [facility]      ORTHOPAEDIC FOOT AND ANKLE CLINIC VISIT     Assessment:     Encounter Diagnosis   Name Primary? Ganglion cyst of foot Yes            Plan:   The patient verbalized understanding of exam findings and treatment plan. We engaged in the shared decision-making process and treatment options were discussed at length with the patient. Surgical and conservative management discussed today along with risks and benefits. Left foot ganglion cyst  Needle aspiration and decompression was performed in clinic with instructions to return for follow up if the mass recurs. It was unclear what percentage of the cyst was decompressed today. This cyst is deep, and so the fullness she has in the dorsum of her foot is much less than before the procedure. There is a 50% chance this can recur and sometimes this can recur quickly. Return if symptoms worsen or fail to improve. History of Present Illness:   Chief Complaint: Left foot pain   Yasmin Guido is a 75 y.o. female who is being seen for left foot pain. She states that she has had a left foot mass that has grown slightly in size since the spring. She has had other ganglion cysts on her hand that have been aspirated and injected. Pain is localized at the dorsum of the foot with minimal radiating and described as sharp and severe. Patient denies numbness, tingling or radicular pain. Denies history of neuropathy. Patient does not smoke, does not have diabetes and does not take blood thinners. Patient denies family history of anesthesia complications and has not had any complications with anesthesia.      Pain/symptom timing:  Worse during the day when active  Pain/symptom context:  Worse with activites and work  Pain/symptom modifying factors:  Rest makes better, activities make worse  Pain/symptom associated signs/symptoms: none    Prior treatment   NSAIDsYes Injections No   Bracing/Orthotics No    Physical Therapy No     Orthopedic Surgical History:   See below    Past Medical, Surgical and Social History:  Past Medical History:  has a past medical history of Arthritis, Colon polyp, Degenerative joint disease of right hip, High cholesterol, and Hip pain. Problem List: does not have any pertinent problems on file. Past Surgical History:  has a past surgical history that includes Tubal ligation; Hysterectomy; Tonsillectomy; Shoulder surgery (Right); pr arthrp acetblr/prox fem prostc agrft/algrft (Right, 4/18/2016); pr colonoscopy flx dx w/collj spec when pfrmd (N/A, 2/3/2018); Colonoscopy; and Joint replacement (04/16/2016). Family History: family history includes Alzheimer's disease in her mother; Asthma in her sister; COPD in her father; Dementia in her mother; Hypertension in her mother; Pulmonary fibrosis in her father. Social History:  reports that she has never smoked. She has never used smokeless tobacco. She reports current alcohol use. She reports that she does not use drugs. Current Medications: has a current medication list which includes the following prescription(s): fluticasone. Allergies: is allergic to tramadol. Review of Systems:  General- denies fever/chills  HEENT- denies hearing loss or sore throat  Eyes- denies eye pain or visual disturbances, denies red eyes  Respiratory- denies cough or SOB  Cardio- denies chest pain or palpitations  GI- denies abdominal pain  Endocrine- denies urinary frequency  Urinary- denies pain with urination  Musculoskeletal- Negative except noted above  Skin- denies rashes or wounds  Neurological- denies dizziness or headache  Psychiatric- denies anxiety or difficulty concentrating    Physical Exam:   LMP  (LMP Unknown)   General/Constitutional: No apparent distress: well-nourished and well developed.   Eyes: normal ocular motion  Cardio: RRR, Normal S1S2, No m/r/g  Lymphatic: No appreciable lymphadenopathy  Respiratory: Non-labored breathing, CTA b/l no w/c/r  Vascular: No edema, swelling or tenderness, except as noted in detailed exam.  Integumentary: No impressive skin lesions present, except as noted in detailed exam.  Neuro: No ataxia or tremors noted  Psych: Normal mood and affect, oriented to person, place and time. Appropriate affect. Musculoskeletal: Normal, except as noted in detailed exam and in HPI. Examination    Left    Gait Normal   Musculoskeletal Tender to palpation over the dorsum of the foot on the mass    Skin Normal.      Nails Normal    Range of Motion  30 degrees dorsiflexion, 40 degrees plantarflexion  Subtalar motion: normal    Stability Stable    Muscle Strength 5/5 tibialis anterior  5/5 gastrocnemius-soleus  5/5 posterior tibialis  5/5 peroneal/eversion strength  5/5 EHL  5/5 FHL    Neurologic Normal    Sensation Intact to light touch throughout sural, saphenous, superficial peroneal, deep peroneal and medial/lateral plantar nerve distributions. Lockhart-Moe 5.07 filament (10g) testing  deferred. Cardiovascular Brisk capillary refill < 2 seconds,intact DP and PT pulses    Special Tests None      Imaging Studies:   3 views of the foot were taken, reviewed and interpreted independently that demonstrate no acute fracture or dislocation. MRI of the left foot and ankle demonstrate a small well circumscribed hyperintense soft tissue lesion that is cystic in nature Reviewed by me personally. Small joint arthrocentesis  Universal Protocol:  Risks and benefits: risks, benefits and alternatives were discussed  Consent given by: patient  Timeout called at: 11/14/2023 1:19 PM.  Patient understanding: patient states understanding of the procedure being performed  Patient identity confirmed: verbally with patient  Supporting Documentation  Indications: pain   Procedure Details  Location: foot - Foot joint: Aspiration of left ganglion cyst on dorsum of foot.   Needle size: 20 G  Ultrasound guidance: no  Approach: dorsal    Aspirate amount: 4 mL  Aspirate: blood-tinged (gelatinous)  Patient tolerance: patient tolerated the procedure well with no immediate complications             Greig Baumgarten. Lachman, MD  Foot & Ankle Surgery   Department of [address] [place] [address] personally performed the service. Greig Baumgarten.  Lachman, MD

## 2023-11-15 ENCOUNTER — TELEPHONE (OUTPATIENT)
Dept: GASTROENTEROLOGY | Facility: CLINIC | Age: 68
End: 2023-11-15

## 2023-11-15 NOTE — TELEPHONE ENCOUNTER
----- Message from Reyna Del Cid MD sent at 11/15/2023  7:46 AM EST -----  please tell her the polyps removed were precancerous and we will see her at the follow-up in 2 years

## 2023-11-16 ENCOUNTER — TELEPHONE (OUTPATIENT)
Age: 68
End: 2023-11-16

## 2023-11-16 NOTE — TELEPHONE ENCOUNTER
This can be usual after sticking a needle in that region. Elevation and ice can make a different over the next few days.

## 2023-11-16 NOTE — TELEPHONE ENCOUNTER
Caller: Patient     Doctor: Lachman    Reason for call: Patient had cyst aspiration on 11/14/23. She is now having swelling on the inside on her foot.      Call back#: [phone]

## 2023-11-26 ENCOUNTER — OFFICE VISIT (OUTPATIENT)
Dept: URGENT CARE | Facility: CLINIC | Age: 68
End: 2023-11-26
Payer: MEDICARE

## 2023-11-26 VITALS
BODY MASS INDEX: 26.84 KG/M2 | HEART RATE: 70 BPM | TEMPERATURE: 98 F | SYSTOLIC BLOOD PRESSURE: 134 MMHG | DIASTOLIC BLOOD PRESSURE: 67 MMHG | HEIGHT: 66 IN | OXYGEN SATURATION: 96 % | WEIGHT: 167 LBS

## 2023-11-26 DIAGNOSIS — S20.361A TICK BITE OF RIGHT SIDE OF CHEST WALL, INITIAL ENCOUNTER: Primary | ICD-10-CM
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
## 2023-11-26 PROCEDURE — G0463 HOSPITAL OUTPT CLINIC VISIT: HCPCS | Performed by: PHYSICIAN ASSISTANT

## 2023-11-26 PROCEDURE — 99213 OFFICE O/P EST LOW 20 MIN: CPT | Performed by: PHYSICIAN ASSISTANT

## 2023-11-26 NOTE — PATIENT INSTRUCTIONS
Monitor the area. If it continues to spread get reevaluated otherwise follow-up with your primary care provider in the next 3-5 days. Any new or worsening symptoms develop get re-evaluated sooner or proceed to the ER.

## 2023-11-26 NOTE — PROGRESS NOTES
[facility]        NAME: Yanelis Trejo is a 75 y.o. female  : 1955    MRN: [id number]  DATE: 2023  TIME: 4:30 PM    Assessment and Plan   Tick bite of right side of chest wall, initial encounter [S20.361A, W57. XXXA]  1. Tick bite of right side of chest wall, initial encounter              Patient Instructions       Follow up with PCP in 3-5 days. Proceed to  ER if symptoms worsen. Chief Complaint     Chief Complaint   Patient presents with    Insect Bite     Patient found the tick on her body Friday night. She was able to remove the tick completely. Today patient noticed the area of the bite red and it is itching. Has gotten bigger since the morning until now. History of Present Illness       Patient presents with a tick bite over the right chest.  Removed the tick 2 nights ago. Removed the tick then. The next morning had a small red spot but today its gotten worse. Denies fevers, muscle/body aches, headaches, visual disturbances. Unsure the type of tick. Unsure how long it was on for but states it was less than 24 hours. Review of Systems   Review of Systems   Constitutional:  Negative for fever. Musculoskeletal:  Negative for arthralgias and joint swelling. Skin:  Positive for rash. Neurological:  Negative for dizziness and weakness.          Current Medications       Current Outpatient Medications:     fluticasone (FLONASE) 50 mcg/act nasal spray, 1 spray into each nostril daily, Disp: 1 Bottle, Rfl: 3    Current Allergies     Allergies as of 2023 - Reviewed 2023   Allergen Reaction Noted    Tramadol GI Intolerance 2018            The following portions of the patient's history were reviewed and updated as appropriate: allergies, current medications, past family history, past medical history, past social history, past surgical history and problem list.     Past Medical History:   Diagnosis Date    Arthritis     Colon polyp Degenerative joint disease of right hip     High cholesterol     treated with diet changes and fish oil    Hip pain        Past Surgical History:   Procedure Laterality Date    COLONOSCOPY      HYSTERECTOMY      JOINT REPLACEMENT  04/16/2016    AK ARTHRP ACETBLR/PROX FEM PROSTC AGRFT/ALGRFT Right 4/18/2016    Procedure: TOTAL HIP ARTHROPLASTY ;  Surgeon: Eden Amin MD;  Location: [facility];  Service: Orthopedics    AK COLONOSCOPY FLX DX W/COLLJ SPEC WHEN PFRMD N/A 2/3/2018    Procedure: COLONOSCOPY;  Surgeon: Deon Kebede MD;  Location: [facility]; Service: Gastroenterology    SHOULDER SURGERY Right     reconstruction for chronic anterior dislocation    TONSILLECTOMY      with adenoidectomy    TUBAL LIGATION         Family History   Problem Relation Age of Onset    Hypertension Mother     Alzheimer's disease Mother     Dementia Mother     COPD Father     Pulmonary fibrosis Father     Asthma Sister          Medications have been verified. Objective   /67   Pulse 70   Temp 98 °F (36.7 °C)   Ht 5' 6" (1.676 m)   Wt 75.8 kg (167 lb)   LMP  (LMP Unknown)   SpO2 96%   BMI 26.95 kg/m²   No LMP recorded (lmp unknown). Patient has had a hysterectomy. Physical Exam     Physical Exam  Constitutional:       Appearance: Normal appearance. Chest:       Neurological:      Mental Status: She is alert.    Psychiatric:         Mood and Affect: Mood normal.         Behavior: Behavior normal.

## 2024-03-22 ENCOUNTER — OFFICE VISIT (OUTPATIENT)
Dept: FAMILY MEDICINE CLINIC | Facility: MEDICAL CENTER | Age: 69
End: 2024-03-22
Payer: MEDICARE

## 2024-03-22 VITALS
SYSTOLIC BLOOD PRESSURE: 122 MMHG | DIASTOLIC BLOOD PRESSURE: 78 MMHG | WEIGHT: 172.4 LBS | TEMPERATURE: 98.5 F | OXYGEN SATURATION: 100 % | BODY MASS INDEX: 27.83 KG/M2 | RESPIRATION RATE: 14 BRPM | HEART RATE: 70 BPM

## 2024-03-22 DIAGNOSIS — E78.5 HYPERLIPIDEMIA, UNSPECIFIED HYPERLIPIDEMIA TYPE: ICD-10-CM

## 2024-03-22 DIAGNOSIS — R06.2 WHEEZING: ICD-10-CM

## 2024-03-22 DIAGNOSIS — E66.3 OVERWEIGHT (BMI 25.0-29.9): ICD-10-CM

## 2024-03-22 DIAGNOSIS — Z00.00 MEDICARE ANNUAL WELLNESS VISIT, SUBSEQUENT: Primary | ICD-10-CM

## 2024-03-22 DIAGNOSIS — Z13.1 SCREENING FOR DIABETES MELLITUS (DM): ICD-10-CM

## 2024-03-22 PROBLEM — K63.5 COLON POLYP: Status: ACTIVE | Noted: 2024-03-22

## 2024-03-22 PROCEDURE — 99213 OFFICE O/P EST LOW 20 MIN: CPT | Performed by: STUDENT IN AN ORGANIZED HEALTH CARE EDUCATION/TRAINING PROGRAM

## 2024-03-22 PROCEDURE — G0438 PPPS, INITIAL VISIT: HCPCS | Performed by: STUDENT IN AN ORGANIZED HEALTH CARE EDUCATION/TRAINING PROGRAM

## 2024-03-22 NOTE — PATIENT INSTRUCTIONS
Medicare Preventive Visit Patient Instructions  Thank you for completing your Welcome to Medicare Visit or Medicare Annual Wellness Visit today. Your next wellness visit will be due in one year (3/23/2025).  The screening/preventive services that you may require over the next 5-10 years are detailed below. Some tests may not apply to you based off risk factors and/or age. Screening tests ordered at today's visit but not completed yet may show as past due. Also, please note that scanned in results may not display below.  Preventive Screenings:  Service Recommendations Previous Testing/Comments   Colorectal Cancer Screening  * Colonoscopy    * Fecal Occult Blood Test (FOBT)/Fecal Immunochemical Test (FIT)  * Fecal DNA/Cologuard Test  * Flexible Sigmoidoscopy Age: 45-75 years old   Colonoscopy: every 10 years (may be performed more frequently if at higher risk)  OR  FOBT/FIT: every 1 year  OR  Cologuard: every 3 years  OR  Sigmoidoscopy: every 5 years  Screening may be recommended earlier than age 45 if at higher risk for colorectal cancer. Also, an individualized decision between you and your healthcare provider will decide whether screening between the ages of 76-85 would be appropriate. Colonoscopy: 11/10/2023  FOBT/FIT: Not on file  Cologuard: Not on file  Sigmoidoscopy: Not on file    Screening Current     Breast Cancer Screening Age: 40+ years old  Frequency: every 1-2 years  Not required if history of left and right mastectomy Mammogram: 02/05/2024    Screening Current   Cervical Cancer Screening Between the ages of 21-29, pap smear recommended once every 3 years.   Between the ages of 30-65, can perform pap smear with HPV co-testing every 5 years.   Recommendations may differ for women with a history of total hysterectomy, cervical cancer, or abnormal pap smears in past. Pap Smear: 09/22/2023    Screening Not Indicated   Hepatitis C Screening Once for adults born between 1945 and 1965  More frequently in  patients at high risk for Hepatitis C Hep C Antibody: 03/07/2022    Screening Current   Diabetes Screening 1-2 times per year if you're at risk for diabetes or have pre-diabetes Fasting glucose: 78 mg/dL (10/26/2023)  A1C: No results in last 5 years (No results in last 5 years)  Screening Current   Cholesterol Screening Once every 5 years if you don't have a lipid disorder. May order more often based on risk factors. Lipid panel: 02/10/2023    Screening Not Indicated  History Lipid Disorder     Other Preventive Screenings Covered by Medicare:  Abdominal Aortic Aneurysm (AAA) Screening: covered once if your at risk. You're considered to be at risk if you have a family history of AAA.  Lung Cancer Screening: covers low dose CT scan once per year if you meet all of the following conditions: (1) Age 55-77; (2) No signs or symptoms of lung cancer; (3) Current smoker or have quit smoking within the last 15 years; (4) You have a tobacco smoking history of at least 20 pack years (packs per day multiplied by number of years you smoked); (5) You get a written order from a healthcare provider.  Glaucoma Screening: covered annually if you're considered high risk: (1) You have diabetes OR (2) Family history of glaucoma OR (3)  aged 50 and older OR (4)  American aged 65 and older  Osteoporosis Screening: covered every 2 years if you meet one of the following conditions: (1) You're estrogen deficient and at risk for osteoporosis based off medical history and other findings; (2) Have a vertebral abnormality; (3) On glucocorticoid therapy for more than 3 months; (4) Have primary hyperparathyroidism; (5) On osteoporosis medications and need to assess response to drug therapy.   Last bone density test (DXA Scan): 02/03/2023.  HIV Screening: covered annually if you're between the age of 15-65. Also covered annually if you are younger than 15 and older than 65 with risk factors for HIV infection. For pregnant  patients, it is covered up to 3 times per pregnancy.    Immunizations:  Immunization Recommendations   Influenza Vaccine Annual influenza vaccination during flu season is recommended for all persons aged >= 6 months who do not have contraindications   Pneumococcal Vaccine   * Pneumococcal conjugate vaccine = PCV13 (Prevnar 13), PCV15 (Vaxneuvance), PCV20 (Prevnar 20)  * Pneumococcal polysaccharide vaccine = PPSV23 (Pneumovax) Adults 19-65 yo with certain risk factors or if 65+ yo  If never received any pneumonia vaccine: recommend Prevnar 20 (PCV20)  Give PCV20 if previously received 1 dose of PCV13 or PPSV23   Hepatitis B Vaccine 3 dose series if at intermediate or high risk (ex: diabetes, end stage renal disease, liver disease)   Respiratory syncytial virus (RSV) Vaccine - COVERED BY MEDICARE PART D  * RSVPreF3 (Arexvy) CDC recommends that adults 60 years of age and older may receive a single dose of RSV vaccine using shared clinical decision-making (SCDM)   Tetanus (Td) Vaccine - COST NOT COVERED BY MEDICARE PART B Following completion of primary series, a booster dose should be given every 10 years to maintain immunity against tetanus. Td may also be given as tetanus wound prophylaxis.   Tdap Vaccine - COST NOT COVERED BY MEDICARE PART B Recommended at least once for all adults. For pregnant patients, recommended with each pregnancy.   Shingles Vaccine (Shingrix) - COST NOT COVERED BY MEDICARE PART B  2 shot series recommended in those 19 years and older who have or will have weakened immune systems or those 50 years and older     Health Maintenance Due:      Topic Date Due   • DXA SCAN  02/03/2025   • Breast Cancer Screening: Mammogram  02/05/2025   • Colorectal Cancer Screening  11/09/2025   • Hepatitis C Screening  Completed     Immunizations Due:      Topic Date Due   • COVID-19 Vaccine (5 - 2023-24 season) 12/11/2023     Advance Directives   What are advance directives?  Advance directives are legal  documents that state your wishes and plans for medical care. These plans are made ahead of time in case you lose your ability to make decisions for yourself. Advance directives can apply to any medical decision, such as the treatments you want, and if you want to donate organs.   What are the types of advance directives?  There are many types of advance directives, and each state has rules about how to use them. You may choose a combination of any of the following:  Living will:  This is a written record of the treatment you want. You can also choose which treatments you do not want, which to limit, and which to stop at a certain time. This includes surgery, medicine, IV fluid, and tube feedings.   Durable power of  for healthcare (DPAHC):  This is a written record that states who you want to make healthcare choices for you when you are unable to make them for yourself. This person, called a proxy, is usually a family member or a friend. You may choose more than 1 proxy.  Do not resuscitate (DNR) order:  A DNR order is used in case your heart stops beating or you stop breathing. It is a request not to have certain forms of treatment, such as CPR. A DNR order may be included in other types of advance directives.  Medical directive:  This covers the care that you want if you are in a coma, near death, or unable to make decisions for yourself. You can list the treatments you want for each condition. Treatment may include pain medicine, surgery, blood transfusions, dialysis, IV or tube feedings, and a ventilator (breathing machine).  Values history:  This document has questions about your views, beliefs, and how you feel and think about life. This information can help others choose the care that you would choose.  Why are advance directives important?  An advance directive helps you control your care. Although spoken wishes may be used, it is better to have your wishes written down. Spoken wishes can be  misunderstood, or not followed. Treatments may be given even if you do not want them. An advance directive may make it easier for your family to make difficult choices about your care.   Urinary Incontinence   Urinary incontinence (UI)  is when you lose control of your bladder. UI develops because your bladder cannot store or empty urine properly. The 3 most common types of UI are stress incontinence, urge incontinence, or both.  Medicines:   May be given to help strengthen your bladder control. Report any side effects of medication to your healthcare provider.  Do pelvic muscle exercises often:  Your pelvic muscles help you stop urinating. Squeeze these muscles tight for 5 seconds, then relax for 5 seconds. Gradually work up to squeezing for 10 seconds. Do 3 sets of 15 repetitions a day, or as directed. This will help strengthen your pelvic muscles and improve bladder control.  Train your bladder:  Go to the bathroom at set times, such as every 2 hours, even if you do not feel the urge to go. You can also try to hold your urine when you feel the urge to go. For example, hold your urine for 5 minutes when you feel the urge to go. As that becomes easier, hold your urine for 10 minutes.   Self-care:   Keep a UI record.  Write down how often you leak urine and how much you leak. Make a note of what you were doing when you leaked urine.  Drink liquids as directed. You may need to limit the amount of liquid you drink to help control your urine leakage. Do not drink any liquid right before you go to bed. Limit or do not have drinks that contain caffeine or alcohol.   Prevent constipation.  Eat a variety of high-fiber foods. Good examples are high-fiber cereals, beans, vegetables, and whole-grain breads. Walking is the best way to trigger your intestines to have a bowel movement.  Exercise regularly and maintain a healthy weight.  Weight loss and exercise will decrease pressure on your bladder and help you control your  leakage.   Use a catheter as directed  to help empty your bladder. A catheter is a tiny, plastic tube that is put into your bladder to drain your urine.   Go to behavior therapy as directed.  Behavior therapy may be used to help you learn to control your urge to urinate.    Weight Management   Why it is important to manage your weight:  Being overweight increases your risk of health conditions such as heart disease, high blood pressure, type 2 diabetes, and certain types of cancer. It can also increase your risk for osteoarthritis, sleep apnea, and other respiratory problems. Aim for a slow, steady weight loss. Even a small amount of weight loss can lower your risk of health problems.  How to lose weight safely:  A safe and healthy way to lose weight is to eat fewer calories and get regular exercise. You can lose up about 1 pound a week by decreasing the number of calories you eat by 500 calories each day.   Healthy meal plan for weight management:  A healthy meal plan includes a variety of foods, contains fewer calories, and helps you stay healthy. A healthy meal plan includes the following:  Eat whole-grain foods more often.  A healthy meal plan should contain fiber. Fiber is the part of grains, fruits, and vegetables that is not broken down by your body. Whole-grain foods are healthy and provide extra fiber in your diet. Some examples of whole-grain foods are whole-wheat breads and pastas, oatmeal, brown rice, and bulgur.  Eat a variety of vegetables every day.  Include dark, leafy greens such as spinach, kale, doyle greens, and mustard greens. Eat yellow and orange vegetables such as carrots, sweet potatoes, and winter squash.   Eat a variety of fruits every day.  Choose fresh or canned fruit (canned in its own juice or light syrup) instead of juice. Fruit juice has very little or no fiber.  Eat low-fat dairy foods.  Drink fat-free (skim) milk or 1% milk. Eat fat-free yogurt and low-fat cottage cheese. Try  low-fat cheeses such as mozzarella and other reduced-fat cheeses.  Choose meat and other protein foods that are low in fat.  Choose beans or other legumes such as split peas or lentils. Choose fish, skinless poultry (chicken or turkey), or lean cuts of red meat (beef or pork). Before you cook meat or poultry, cut off any visible fat.   Use less fat and oil.  Try baking foods instead of frying them. Add less fat, such as margarine, sour cream, regular salad dressing and mayonnaise to foods. Eat fewer high-fat foods. Some examples of high-fat foods include french fries, doughnuts, ice cream, and cakes.  Eat fewer sweets.  Limit foods and drinks that are high in sugar. This includes candy, cookies, regular soda, and sweetened drinks.  Exercise:  Exercise at least 30 minutes per day on most days of the week. Some examples of exercise include walking, biking, dancing, and swimming. You can also fit in more physical activity by taking the stairs instead of the elevator or parking farther away from stores. Ask your healthcare provider about the best exercise plan for you.      © Copyright [employer] 2018 Information is for End User's use only and may not be sold, redistributed or otherwise used for commercial purposes. All illustrations and images included in CareNotes® are the copyrighted property of [employer] or [employer]

## 2024-03-22 NOTE — ASSESSMENT & PLAN NOTE
-faint wheezing on exam today  -Secondhand smoke exposure  -Advised patient to contact me should wheezing become more apparent, shortness of breath, DOSS  -Consider starting albuterol inhaler as needed

## 2024-03-22 NOTE — PROGRESS NOTES
Assessment and Plan:     Problem List Items Addressed This Visit          Other    Hyperlipidemia    Relevant Orders    Lipid Panel with Direct LDL reflex    Wheezing     -faint wheezing on exam today  -Secondhand smoke exposure  -Advised patient to contact me should wheezing become more apparent, shortness of breath, DOSS  -Consider starting albuterol inhaler as needed         Medicare annual wellness visit, subsequent - Primary     Immunizations reviewed  Counseled about new RSV vaccine, newest COVID-19 vaccine, Shingrix vaccination series and tetanus booster  Immunizations otherwise up-to-date          Other Visit Diagnoses       Overweight (BMI 25.0-29.9)        Relevant Orders    Comprehensive metabolic panel    Screening for diabetes mellitus (DM)        Relevant Orders    Comprehensive metabolic panel            Depression Screening and Follow-up Plan: Patient was screened for depression during today's encounter. They screened negative with a PHQ-2 score of 0.    Urinary Incontinence Plan of Care: counseling topics discussed: use restroom every 2 hours.       Preventive health issues were discussed with patient, and age appropriate screening tests were ordered as noted in patient's After Visit Summary.  Personalized health advice and appropriate referrals for health education or preventive services given if needed, as noted in patient's After Visit Summary.    Follow-up in September 2024 when well woman exam due and sooner as needed.   History of Present Illness:     Patient presents for a Medicare Wellness Visit. She feels well.     HPI   Patient Care Team:  Amanda Westbrook DO as PCP - General (Family Medicine)  Andreas Resendez III, MD as Endoscopist     Review of Systems:     Review of Systems    As noted in HPI      Problem List:     Patient Active Problem List   Diagnosis    Status post total replacement of right hip    Hyperlipidemia    Second hand smoke exposure    Wheezing    Medicare annual wellness  visit, subsequent    BMI 27.0-27.9,adult    Colon polyp      Past Medical and Surgical History:     Past Medical History:   Diagnosis Date    Arthritis     Colon polyp 3/22/2024    Degenerative joint disease of right hip     High cholesterol     treated with diet changes and fish oil    Hip pain      Past Surgical History:   Procedure Laterality Date    COLONOSCOPY      HYSTERECTOMY      JOINT REPLACEMENT  04/16/2016    [place] ARTHRP ACETBLR/PROX FEM PROSTC AGRFT/ALGRFT Right 4/18/2016    Procedure: TOTAL HIP ARTHROPLASTY ;  Surgeon: Valdo Aquino MD;  [facility];  Service: Orthopedics    [place] COLONOSCOPY FLX DX W/COLLJ SPEC WHEN PFRMD N/A 2/3/2018    Procedure: COLONOSCOPY;  Surgeon: Andreas Resendez III, MD;  Location: [facility];  Service: Gastroenterology    SHOULDER SURGERY Right     reconstruction for chronic anterior dislocation    TONSILLECTOMY      with adenoidectomy    TUBAL LIGATION        Family History:     Family History   Problem Relation Age of Onset    Hypertension Mother     Alzheimer's disease Mother     Dementia Mother     COPD Father     Pulmonary fibrosis Father     Asthma Sister       Social History:     Social History     Socioeconomic History    Marital status: /Civil Union     Spouse name: None    Number of children: None    Years of education: None    Highest education level: None   Occupational History    Occupation:      Comment: 30 years   Tobacco Use    Smoking status: Never    Smokeless tobacco: Never    Tobacco comments:     SECOND HAND SMOKE   Vaping Use    Vaping status: Never Used   Substance and Sexual Activity    Alcohol use: Yes     Comment: occasional    Drug use: No    Sexual activity: Not Currently     Partners: Male     Birth control/protection: Post-menopausal, Surgical   Other Topics Concern    None   Social History Narrative    None     Social Determinants of Health     Financial Resource Strain: Low Risk  (2/15/2023)    Overall Financial  Resource Strain (CARDIA)     Difficulty of Paying Living Expenses: Not very hard   Food Insecurity: No Food Insecurity (3/21/2024)    Hunger Vital Sign     Worried About Running Out of Food in the Last Year: Never true     Ran Out of Food in the Last Year: Never true   Transportation Needs: No Transportation Needs (3/21/2024)    PRAPARE - Transportation     Lack of Transportation (Medical): No     Lack of Transportation (Non-Medical): No   Physical Activity: Not on file   Stress: Not on file   Social Connections: Not on file   Intimate Partner Violence: Not on file   Housing Stability: Low Risk  (3/21/2024)    Housing Stability Vital Sign     Unable to Pay for Housing in the Last Year: No     Number of Places Lived in the Last Year: 1     Unstable Housing in the Last Year: No      Medications and Allergies:     Current Outpatient Medications   Medication Sig Dispense Refill    fluticasone (FLONASE) 50 mcg/act nasal spray 1 spray into each nostril daily 1 Bottle 3     No current facility-administered medications for this visit.     Allergies   Allergen Reactions    Tramadol GI Intolerance      Immunizations:     Immunization History   Administered Date(s) Administered    COVID-19 MODERNA VACC 0.5 ML IM 03/20/2021, 04/17/2021, 12/16/2021    COVID-19 Moderna mRNA Vaccine 12 Yr+ 50 mcg/0.5 mL (Spikevax) 10/16/2023    H1N1, All Formulations 01/11/2010    INFLUENZA 10/01/2021, 11/09/2022    Influenza, high dose seasonal 0.7 mL 10/13/2020, 11/09/2022, 09/27/2023    Pneumococcal Conjugate 13-Valent 09/03/2019    Pneumococcal Conjugate Vaccine 20-valent (Pcv20), Polysace 02/15/2023    Pneumococcal Polysaccharide PPV23 02/14/2022      Health Maintenance:         Topic Date Due    DXA SCAN  02/03/2025    Breast Cancer Screening: Mammogram  02/05/2025    Colorectal Cancer Screening  11/09/2025    Hepatitis C Screening  Completed         Topic Date Due    COVID-19 Vaccine (5 - 2023-24 season) 12/11/2023      Medicare Screening  Tests and Risk Assessments:     Yady is here for her Subsequent Wellness visit.     Health Risk Assessment:   Patient rates overall health as very good. Patient feels that their physical health rating is same. Patient is satisfied with their life. Eyesight was rated as same. Hearing was rated as same. Patient feels that their emotional and mental health rating is same. Patients states they are never, rarely angry. Patient states they are never, rarely unusually tired/fatigued. Pain experienced in the last 7 days has been none. Patient states that she has experienced no weight loss or gain in last 6 months.     Depression Screening:   PHQ-2 Score: 0      Fall Risk Screening:   In the past year, patient has experienced: no history of falling in past year      Urinary Incontinence Screening:   Patient has leaked urine accidently in the last six months. small amount    Home Safety:  Patient does not have trouble with stairs inside or outside of their home. Patient has working smoke alarms and has working carbon monoxide detector. Home safety hazards include: none.     Nutrition:   Current diet is Regular.     Medications:   Patient is not currently taking any over-the-counter supplements. Patient is able to manage medications.     Activities of Daily Living (ADLs)/Instrumental Activities of Daily Living (IADLs):   Walk and transfer into and out of bed and chair?: Yes  Dress and groom yourself?: Yes    Bathe or shower yourself?: Yes    Feed yourself? Yes  Do your laundry/housekeeping?: Yes  Manage your money, pay your bills and track your expenses?: Yes  Make your own meals?: Yes    Do your own shopping?: Yes    Previous Hospitalizations:   Any hospitalizations or ED visits within the last 12 months?: No      Advance Care Planning:   Living will: No    Durable POA for healthcare: No    Advanced directive: No    Advanced directive counseling given: Yes    ACP document given: Yes    Patient declined ACP directive: No       Cognitive Screening:   Provider or family/friend/caregiver concerned regarding cognition?: No    PREVENTIVE SCREENINGS      Cardiovascular Screening:    General: History Lipid Disorder    Due for: Lipid Panel      Diabetes Screening:     General: Risks and Benefits Discussed    Due for: Blood Glucose      Colorectal Cancer Screening:     General: Screening Current      Breast Cancer Screening:     General: Screening Current      Cervical Cancer Screening:    General: Screening Not Indicated      Osteoporosis Screening:    General: Screening Current      Abdominal Aortic Aneurysm (AAA) Screening:        General: Screening Not Indicated      Lung Cancer Screening:     General: Screening Not Indicated      Hepatitis C Screening:    General: Screening Current    Screening, Brief Intervention, and Referral to Treatment (SBIRT)    Screening  Typical number of drinks in a day: 0  Typical number of drinks in a week: 0  Interpretation: Low risk drinking behavior.    AUDIT-C Screenin) How often did you have a drink containing alcohol in the past year? monthly or less  2) How many drinks did you have on a typical day when you were drinking in the past year? 1 to 2  3) How often did you have 6 or more drinks on one occasion in the past year? never    AUDIT-C Score: 1  Interpretation: Score 0-2 (female): Negative screen for alcohol misuse    Single Item Drug Screening:  How often have you used an illegal drug (including marijuana) or a prescription medication for non-medical reasons in the past year? never    Single Item Drug Screen Score: 0  Interpretation: Negative screen for possible drug use disorder    Other Counseling Topics:   Car/seat belt/driving safety, skin self-exam and sunscreen. Sees Dermatology regularly for skin checks    No results found.     Physical Exam:     /78 (BP Location: Left arm, Patient Position: Sitting, Cuff Size: Standard)   Pulse 70   Temp 98.5 °F (36.9 °C) (Temporal)   Resp 14    Wt 78.2 kg (172 lb 6.4 oz)   LMP  (LMP Unknown)   SpO2 100%   BMI 27.83 kg/m²     Physical Exam  Vitals reviewed.   Constitutional:       General: She is not in acute distress.     Appearance: Normal appearance.   HENT:      Head: Normocephalic and atraumatic.      Right Ear: External ear normal.      Left Ear: External ear normal.      Nose: Nose normal.      Mouth/Throat:      Mouth: Mucous membranes are moist.      Pharynx: Oropharynx is clear.   Eyes:      Extraocular Movements: Extraocular movements intact.      Conjunctiva/sclera: Conjunctivae normal.      Pupils: Pupils are equal, round, and reactive to light.   Cardiovascular:      Rate and Rhythm: Normal rate and regular rhythm.      Pulses: Normal pulses.      Heart sounds: Normal heart sounds.   Pulmonary:      Effort: Pulmonary effort is normal. No respiratory distress.      Breath sounds: Wheezes: faint wheezes.   Abdominal:      General: Abdomen is flat. Bowel sounds are normal.      Palpations: Abdomen is soft.      Tenderness: There is no abdominal tenderness.   Musculoskeletal:      Cervical back: Neck supple.      Right lower leg: No edema.      Left lower leg: No edema.   Lymphadenopathy:      Cervical: No cervical adenopathy.   Skin:     General: Skin is warm and dry.      Capillary Refill: Capillary refill takes less than 2 seconds.   Neurological:      Mental Status: She is alert and oriented to person, place, and time.   Psychiatric:         Mood and Affect: Mood normal.         Behavior: Behavior normal.         Thought Content: Thought content normal.         Judgment: Judgment normal.          Amanda Westbrook DO

## 2024-03-22 NOTE — ASSESSMENT & PLAN NOTE
Immunizations reviewed  Counseled about new RSV vaccine, newest COVID-19 vaccine, Shingrix vaccination series and tetanus booster  Immunizations otherwise up-to-date

## 2024-03-27 ENCOUNTER — APPOINTMENT (OUTPATIENT)
Dept: LAB | Facility: CLINIC | Age: 69
End: 2024-03-27
Payer: MEDICARE

## 2024-03-27 DIAGNOSIS — E78.5 HYPERLIPIDEMIA, UNSPECIFIED HYPERLIPIDEMIA TYPE: ICD-10-CM

## 2024-03-27 DIAGNOSIS — Z13.1 SCREENING FOR DIABETES MELLITUS (DM): ICD-10-CM

## 2024-03-27 DIAGNOSIS — E66.3 OVERWEIGHT (BMI 25.0-29.9): ICD-10-CM

## 2024-03-27 LAB
ALBUMIN SERPL BCP-MCNC: 4.1 G/DL (ref 3.5–5)
ALP SERPL-CCNC: 69 U/L (ref 34–104)
ALT SERPL W P-5'-P-CCNC: 19 U/L (ref 7–52)
ANION GAP SERPL CALCULATED.3IONS-SCNC: 6 MMOL/L (ref 4–13)
AST SERPL W P-5'-P-CCNC: 17 U/L (ref 13–39)
BILIRUB SERPL-MCNC: 0.45 MG/DL (ref 0.2–1)
BUN SERPL-MCNC: 13 MG/DL (ref 5–25)
CALCIUM SERPL-MCNC: 8.9 MG/DL (ref 8.4–10.2)
CHLORIDE SERPL-SCNC: 105 MMOL/L (ref 96–108)
CHOLEST SERPL-MCNC: 216 MG/DL
CO2 SERPL-SCNC: 31 MMOL/L (ref 21–32)
CREAT SERPL-MCNC: 0.8 MG/DL (ref 0.6–1.3)
GFR SERPL CREATININE-BSD FRML MDRD: 75 ML/MIN/1.73SQ M
GLUCOSE P FAST SERPL-MCNC: 91 MG/DL (ref 65–99)
HDLC SERPL-MCNC: 39 MG/DL
LDLC SERPL CALC-MCNC: 141 MG/DL (ref 0–100)
POTASSIUM SERPL-SCNC: 4 MMOL/L (ref 3.5–5.3)
PROT SERPL-MCNC: 6.5 G/DL (ref 6.4–8.4)
SODIUM SERPL-SCNC: 142 MMOL/L (ref 135–147)
TRIGL SERPL-MCNC: 178 MG/DL

## 2024-03-27 PROCEDURE — 80061 LIPID PANEL: CPT

## 2024-03-27 PROCEDURE — 80053 COMPREHEN METABOLIC PANEL: CPT

## 2024-03-27 PROCEDURE — 36415 COLL VENOUS BLD VENIPUNCTURE: CPT

## 2024-05-01 PROBLEM — Z00.00 MEDICARE ANNUAL WELLNESS VISIT, SUBSEQUENT: Status: RESOLVED | Noted: 2023-02-15 | Resolved: 2024-05-01

## 2024-07-23 ENCOUNTER — OFFICE VISIT (OUTPATIENT)
Dept: URGENT CARE | Facility: CLINIC | Age: 69
End: 2024-07-23
Payer: MEDICARE

## 2024-07-23 VITALS
SYSTOLIC BLOOD PRESSURE: 123 MMHG | OXYGEN SATURATION: 97 % | DIASTOLIC BLOOD PRESSURE: 66 MMHG | HEART RATE: 66 BPM | TEMPERATURE: 96.9 F | RESPIRATION RATE: 18 BRPM

## 2024-07-23 DIAGNOSIS — T63.441A ALLERGIC REACTION TO BEE STING: Primary | ICD-10-CM

## 2024-07-23 PROCEDURE — G0463 HOSPITAL OUTPT CLINIC VISIT: HCPCS | Performed by: PHYSICAL MEDICINE & REHABILITATION

## 2024-07-23 PROCEDURE — 99213 OFFICE O/P EST LOW 20 MIN: CPT | Performed by: PHYSICAL MEDICINE & REHABILITATION

## 2024-07-23 RX ORDER — CEPHALEXIN 500 MG/1
500 CAPSULE ORAL EVERY 12 HOURS SCHEDULED
Qty: 10 CAPSULE | Refills: 0 | Status: SHIPPED | OUTPATIENT
Start: 2024-07-23 | End: 2024-07-28

## 2024-07-23 RX ORDER — PREDNISONE 20 MG/1
40 TABLET ORAL DAILY
Qty: 10 TABLET | Refills: 0 | Status: SHIPPED | OUTPATIENT
Start: 2024-07-23 | End: 2024-07-28

## 2024-07-23 NOTE — PROGRESS NOTES
[facility]        NAME: Yady Monge is a 68 y.o. female  : 1955    MRN: [id number]  DATE: 2024  TIME: 11:17 AM    Assessment and Plan   Allergic reaction to bee sting [T63.441A]  1. Allergic reaction to bee sting  predniSONE 20 mg tablet    cephalexin (KEFLEX) 500 mg capsule            Patient Instructions       Follow up with PCP in 3-5 days.  Proceed to  ER if symptoms worsen.    If tests are performed, our office will contact you with results only if changes need to made to the care plan discussed with you at the visit. You can review your full results on [facility].    Chief Complaint     Chief Complaint   Patient presents with    Hand Swelling     Pt was stung by bee to left index finger on , started with minor swelling, swelling has progressed over last few days.         History of Present Illness       Patient presenting with left hand swelling. The swelling started to the left index finger  evening after the patient was stung by a bee. She states she got OTC Benadryl cream and applied it to the area. By this morning, her hand is swollen and itching. She reports no hx of allergies to bee stings in the past.        Review of Systems   Review of Systems   Constitutional: Negative.    Respiratory: Negative.     Cardiovascular: Negative.    Skin:  Positive for wound.        Wound to left index finger from bee sting with hand swelling         Current Medications       Current Outpatient Medications:     cephalexin (KEFLEX) 500 mg capsule, Take 1 capsule (500 mg total) by mouth every 12 (twelve) hours for 5 days, Disp: 10 capsule, Rfl: 0    fluticasone (FLONASE) 50 mcg/act nasal spray, 1 spray into each nostril daily, Disp: 1 Bottle, Rfl: 3    predniSONE 20 mg tablet, Take 2 tablets (40 mg total) by mouth daily for 5 days, Disp: 10 tablet, Rfl: 0    Current Allergies     Allergies as of 2024 - Reviewed 2024   Allergen Reaction Noted    Bee venom Swelling  07/23/2024    Tramadol GI Intolerance 02/02/2018            The following portions of the patient's history were reviewed and updated as appropriate: allergies, current medications, past family history, past medical history, past social history, past surgical history and problem list.     Past Medical History:   Diagnosis Date    Arthritis     Colon polyp 3/22/2024    Degenerative joint disease of right hip     High cholesterol     treated with diet changes and fish oil    Hip pain        Past Surgical History:   Procedure Laterality Date    COLONOSCOPY      HYSTERECTOMY      JOINT REPLACEMENT  04/16/2016    [place] ARTHRP ACETBLR/PROX FEM PROSTC AGRFT/ALGRFT Right 4/18/2016    Procedure: TOTAL HIP ARTHROPLASTY ;  Surgeon: Valdo Aquino MD;  [facility];  Service: Orthopedics    [place] COLONOSCOPY FLX DX W/COLLJ SPEC WHEN PFRMD N/A 2/3/2018    Procedure: COLONOSCOPY;  Surgeon: Andreas Resendez III, MD;  Location: [facility];  Service: Gastroenterology    SHOULDER SURGERY Right     reconstruction for chronic anterior dislocation    TONSILLECTOMY      with adenoidectomy    TUBAL LIGATION         Family History   Problem Relation Age of Onset    Hypertension Mother     Alzheimer's disease Mother     Dementia Mother     COPD Father     Pulmonary fibrosis Father     Asthma Sister          Medications have been verified.        Objective   /66   Pulse 66   Temp (!) 96.9 °F (36.1 °C)   Resp 18   LMP  (LMP Unknown)   SpO2 97%        Physical Exam     Physical Exam  Vitals reviewed.   Cardiovascular:      Rate and Rhythm: Normal rate and regular rhythm.      Pulses: Normal pulses.      Heart sounds: Normal heart sounds.   Musculoskeletal:         General: Swelling present.      Comments: Left hand swelling   Skin:     Findings: Erythema present.      Comments: Left hand swelling with some erythema surrounded by bee sting to left index finger   Neurological:      General: No focal deficit present.      Sensory: No  sensory deficit.      Motor: No weakness.

## 2024-09-16 ENCOUNTER — RA CDI HCC (OUTPATIENT)
Dept: OTHER | Facility: HOSPITAL | Age: 69
End: 2024-09-16

## 2024-09-25 ENCOUNTER — OFFICE VISIT (OUTPATIENT)
Dept: FAMILY MEDICINE CLINIC | Facility: MEDICAL CENTER | Age: 69
End: 2024-09-25
Payer: MEDICARE

## 2024-09-25 VITALS
TEMPERATURE: 97.3 F | HEART RATE: 69 BPM | HEIGHT: 66 IN | OXYGEN SATURATION: 98 % | BODY MASS INDEX: 27.77 KG/M2 | DIASTOLIC BLOOD PRESSURE: 70 MMHG | WEIGHT: 172.8 LBS | RESPIRATION RATE: 18 BRPM | SYSTOLIC BLOOD PRESSURE: 110 MMHG

## 2024-09-25 DIAGNOSIS — Z01.419 WELL WOMAN EXAM: Primary | ICD-10-CM

## 2024-09-25 DIAGNOSIS — Z78.0 ASYMPTOMATIC POSTMENOPAUSAL STATUS: ICD-10-CM

## 2024-09-25 DIAGNOSIS — Z12.31 ENCOUNTER FOR SCREENING MAMMOGRAM FOR MALIGNANT NEOPLASM OF BREAST: ICD-10-CM

## 2024-09-25 PROCEDURE — G0101 CA SCREEN;PELVIC/BREAST EXAM: HCPCS | Performed by: STUDENT IN AN ORGANIZED HEALTH CARE EDUCATION/TRAINING PROGRAM

## 2024-09-25 NOTE — PROGRESS NOTES
"  [facility] - Clinic Note  Stewartteresa Dariana DO, 24     Yady Monge MRN: [id number] : 1955 Age: 68 y.o.     Assessment/Plan     1. Well woman exam    -Status post hysterectomy  -Mammogram and DXA scan ordered  -Colonoscopy due 2025    2. Asymptomatic postmenopausal status    - DXA bone density spine hip and pelvis; Future    3. Encounter for screening mammogram for malignant neoplasm of breast    - Mammo screening bilateral w 3d and cad; Future    Yady Monge acknowledged understanding of treatment plan, all questions answered.    Subjective      Yady Monge is a 68 y.o. female who presents for annual exam. The patient has no complaints today. The patient is not sexually active. GYN screening history: last mammogram: was normal. The patient is not taking hormone replacement therapy. Patient denies post-menopausal vaginal bleeding.. The patient participates in regular exercise: yes.The patient reports that there is not domestic violence in her life.  Patient status post hysterectomy  \"they left one ovary, lots of fibroid tumors and endometriosis\".  Patient would like her ears checked.      The following portions of the patient's history were reviewed and updated as appropriate: allergies, current medications, past family history, past medical history, past social history, past surgical history and problem list.     Past Medical History:   Diagnosis Date    Arthritis     Colon polyp 3/22/2024    Degenerative joint disease of right hip     High cholesterol     treated with diet changes and fish oil    Hip pain        Allergies   Allergen Reactions    Bee Venom Swelling    Tramadol GI Intolerance       Past Surgical History:   Procedure Laterality Date    COLONOSCOPY      HYSTERECTOMY      JOINT REPLACEMENT  2016    [place] ARTHRP ACETBLR/PROX FEM PROSTC AGRFT/ALGRFT Right 2016    Procedure: TOTAL HIP ARTHROPLASTY ;  Surgeon: Valdo Aquino MD;  [facility];  " Service: Orthopedics    [place] COLONOSCOPY FLX DX W/COLLJ SPEC WHEN PFRMD N/A 2/3/2018    Procedure: COLONOSCOPY;  Surgeon: Andreas Resendez III, MD;  [facility];  Service: Gastroenterology    SHOULDER SURGERY Right     reconstruction for chronic anterior dislocation    TONSILLECTOMY      with adenoidectomy    TUBAL LIGATION         Family History   Problem Relation Age of Onset    Hypertension Mother     Alzheimer's disease Mother     Dementia Mother     COPD Father     Pulmonary fibrosis Father     Asthma Sister        Social History     Socioeconomic History    Marital status: /Civil Union     Spouse name: None    Number of children: None    Years of education: None    Highest education level: None   Occupational History    Occupation:      Comment: 30 years   Tobacco Use    Smoking status: Never    Smokeless tobacco: Never    Tobacco comments:     SECOND HAND SMOKE   Vaping Use    Vaping status: Never Used   Substance and Sexual Activity    Alcohol use: Yes     Comment: occasional    Drug use: No    Sexual activity: Not Currently     Partners: Male     Birth control/protection: Post-menopausal, Surgical   Other Topics Concern    None   Social History Narrative    None     Social Determinants of Health     Financial Resource Strain: Low Risk  (2/15/2023)    Overall Financial Resource Strain (CARDIA)     Difficulty of Paying Living Expenses: Not very hard   Food Insecurity: No Food Insecurity (3/21/2024)    Hunger Vital Sign     Worried About Running Out of Food in the Last Year: Never true     Ran Out of Food in the Last Year: Never true   Transportation Needs: No Transportation Needs (3/21/2024)    PRAPARE - Transportation     Lack of Transportation (Medical): No     Lack of Transportation (Non-Medical): No   Physical Activity: Not on file   Stress: Not on file   Social Connections: Not on file   Intimate Partner Violence: Not on file   Housing Stability: Low Risk  (3/21/2024)     "Housing Stability Vital Sign     Unable to Pay for Housing in the Last Year: No     Number of Times Moved in the Last Year: 1     Homeless in the Last Year: No       Current Outpatient Medications   Medication Sig Dispense Refill    fluticasone (FLONASE) 50 mcg/act nasal spray 1 spray into each nostril daily 1 Bottle 3     No current facility-administered medications for this visit.       Review of Systems     As noted in HPI    Objective      /70 (BP Location: Left arm, Patient Position: Sitting, Cuff Size: Standard)   Pulse 69   Temp (!) 97.3 °F (36.3 °C) (Temporal)   Resp 18   Ht 5' 6\" (1.676 m)   Wt 78.4 kg (172 lb 12.8 oz)   LMP  (LMP Unknown)   SpO2 98%   BMI 27.89 kg/m²     Physical Exam  Vitals reviewed. Exam conducted with a chaperone present (ORACIO Aranda).   Constitutional:       General: She is not in acute distress.     Appearance: Normal appearance.   HENT:      Head: Normocephalic and atraumatic.      Right Ear: Tympanic membrane, ear canal and external ear normal. There is no impacted cerumen.      Left Ear: Tympanic membrane, ear canal and external ear normal. There is no impacted cerumen.   Eyes:      Conjunctiva/sclera: Conjunctivae normal.   Pulmonary:      Effort: Pulmonary effort is normal.   Chest:   Breasts:     Right: No swelling, bleeding, inverted nipple, mass, nipple discharge, skin change or tenderness.      Left: No swelling, bleeding, inverted nipple, mass, nipple discharge, skin change or tenderness.   Abdominal:      Palpations: Abdomen is soft.      Tenderness: There is no abdominal tenderness.   Genitourinary:     Pubic Area: No rash.       Labia:         Right: No rash, tenderness, lesion or injury.         Left: No rash, tenderness, lesion or injury.       Vagina: Normal.      Uterus: Absent.       Adnexa:         Right: No mass, tenderness or fullness.          Left: No mass, tenderness or fullness.        Rectum: Normal.   Lymphadenopathy:      Upper Body:      " "Right upper body: No supraclavicular, axillary or pectoral adenopathy.      Left upper body: No supraclavicular, axillary or pectoral adenopathy.   Skin:     General: Skin is warm and dry.   Neurological:      Mental Status: She is alert and oriented to person, place, and time.   Psychiatric:         Mood and Affect: Mood normal.         Behavior: Behavior normal.         Thought Content: Thought content normal.             Some portions of this record may have been generated with voice recognition software. There may be translation, syntax, or grammatical errors. Occasional wrong word or \"sound-a-like\" substitutions may have occurred due to the inherent limitations of the voice recognition software. Read the chart carefully and recognize, using context, where substations may have occurred. If you have any questions, please contact the dictating provider for clarification or correction, as needed.  "

## 2025-03-27 ENCOUNTER — RA CDI HCC (OUTPATIENT)
Dept: OTHER | Facility: HOSPITAL | Age: 70
End: 2025-03-27

## 2025-03-27 ENCOUNTER — TELEPHONE (OUTPATIENT)
Dept: FAMILY MEDICINE CLINIC | Facility: MEDICAL CENTER | Age: 70
End: 2025-03-27

## 2025-03-27 DIAGNOSIS — E78.5 HYPERLIPIDEMIA, UNSPECIFIED HYPERLIPIDEMIA TYPE: Primary | ICD-10-CM

## 2025-04-02 ENCOUNTER — OFFICE VISIT (OUTPATIENT)
Dept: URGENT CARE | Facility: CLINIC | Age: 70
End: 2025-04-02
Payer: MEDICARE

## 2025-04-02 ENCOUNTER — NURSE TRIAGE (OUTPATIENT)
Age: 70
End: 2025-04-02

## 2025-04-02 VITALS
DIASTOLIC BLOOD PRESSURE: 62 MMHG | RESPIRATION RATE: 18 BRPM | TEMPERATURE: 98.4 F | SYSTOLIC BLOOD PRESSURE: 125 MMHG | OXYGEN SATURATION: 97 % | HEART RATE: 78 BPM

## 2025-04-02 DIAGNOSIS — R05.1 ACUTE COUGH: ICD-10-CM

## 2025-04-02 DIAGNOSIS — J02.9 SORE THROAT: ICD-10-CM

## 2025-04-02 DIAGNOSIS — U07.1 COVID: Primary | ICD-10-CM

## 2025-04-02 LAB
S PYO AG THROAT QL: NEGATIVE
SARS-COV-2 AG UPPER RESP QL IA: POSITIVE
VALID CONTROL: ABNORMAL

## 2025-04-02 PROCEDURE — 87880 STREP A ASSAY W/OPTIC: CPT

## 2025-04-02 PROCEDURE — 99213 OFFICE O/P EST LOW 20 MIN: CPT

## 2025-04-02 PROCEDURE — G0463 HOSPITAL OUTPT CLINIC VISIT: HCPCS

## 2025-04-02 PROCEDURE — 87811 SARS-COV-2 COVID19 W/OPTIC: CPT

## 2025-04-02 NOTE — PATIENT INSTRUCTIONS
Vitamin D3 2000 IU daily.  Vitamin C 1000mg twice per day.  Multivitamin daily.  Some studies suggest that Zinc 2.5-5 mg every 2 hours while awake x 5 days may shorten the duration cold symptoms by 1-2 days.   Fluids and rest.  Nasal saline spray; Afrin if severe congestion (do not use for more than 3 days)  Over the counter cough/cold medications as needed.   Flonase nasal spray.  Tylenol/Ibuprofen for pain/fever.  Salt water gargles and/or chloraseptic spray.  Warm tea with honey.  Warm compresses over sinuses.  Nasal rinses with distilled water.     Follow up with PCP if symptoms persist past 10-14 days.  Proceed to the ER with worsening symptoms.

## 2025-04-02 NOTE — PROGRESS NOTES
[facility]        NAME: Yady Monge is a 69 y.o. female  : 1955    MRN: [id number]  DATE: 2025  TIME: 2:56 PM    Assessment and Plan   COVID [U07.1]  1. COVID        2. Acute cough  Poct Covid 19 Rapid Antigen Test      3. Sore throat  POCT rapid ANTIGEN strepA        Rapid COVID positive.  Rapid strep negative.     Patient Instructions     Vitamin D3 2000 IU daily.  Vitamin C 1000mg twice per day.  Multivitamin daily.  Some studies suggest that Zinc 2.5-5 mg every 2 hours while awake x 5 days may shorten the duration cold symptoms by 1-2 days.   Fluids and rest.  Nasal saline spray; Afrin if severe congestion (do not use for more than 3 days)  Over the counter cough/cold medications as needed.   Flonase nasal spray.  Tylenol/Ibuprofen for pain/fever.  Salt water gargles and/or chloraseptic spray.  Warm tea with honey.  Warm compresses over sinuses.  Nasal rinses with distilled water.     Follow up with PCP if symptoms persist past 10-14 days.  Proceed to the ER with worsening symptoms.     Chief Complaint     Chief Complaint   Patient presents with    Sore Throat     Sore throat, congestion, cough x4 days. States at home covid test () was positive. Chills this morning.         History of Present Illness       The patient presents today with complaints of chills, cough, congestion, and sore throat x 4 days. Denies fevers, SOB, wheezing. Took an  home COVID test which was positive. Has not taken anything OTC for her symptoms.     Sore Throat   This is a new problem. The current episode started yesterday. The problem has been rapidly worsening. Neither side of throat is experiencing more pain than the other. There has been no fever. The pain is at a severity of 4/10. The pain is moderate. Associated symptoms include congestion, coughing, ear pain, headaches, a hoarse voice, a plugged ear sensation and trouble swallowing. Pertinent negatives include no abdominal pain,  diarrhea, drooling, ear discharge, neck pain, shortness of breath, stridor, swollen glands or vomiting.       Review of Systems   Review of Systems   Constitutional:  Positive for chills. Negative for fever.   HENT:  Positive for congestion, ear pain, hoarse voice, postnasal drip, rhinorrhea, sore throat and trouble swallowing. Negative for drooling, ear discharge, sinus pressure and sinus pain.    Respiratory:  Positive for cough. Negative for shortness of breath and stridor.    Gastrointestinal:  Negative for abdominal pain, diarrhea, nausea and vomiting.   Musculoskeletal:  Negative for neck pain.   Neurological:  Positive for headaches.         Current Medications       Current Outpatient Medications:     fluticasone (FLONASE) 50 mcg/act nasal spray, 1 spray into each nostril daily, Disp: 1 Bottle, Rfl: 3    Current Allergies     Allergies as of 04/02/2025 - Reviewed 04/02/2025   Allergen Reaction Noted    Bee venom Swelling 07/23/2024    Tramadol GI Intolerance 02/02/2018            The following portions of the patient's history were reviewed and updated as appropriate: allergies, current medications, past family history, past medical history, past social history, past surgical history and problem list.     Past Medical History:   Diagnosis Date    Arthritis     Colon polyp 3/22/2024    Degenerative joint disease of right hip     High cholesterol     treated with diet changes and fish oil    Hip pain        Past Surgical History:   Procedure Laterality Date    COLONOSCOPY      HYSTERECTOMY      JOINT REPLACEMENT  04/16/2016    [place] ARTHRP ACETBLR/PROX FEM PROSTC AGRFT/ALGRFT Right 4/18/2016    Procedure: TOTAL HIP ARTHROPLASTY ;  Surgeon: Valdo Aquino MD;  [facility];  Service: Orthopedics    KS COLONOSCOPY FLX DX W/COLLJ SPEC WHEN PFRMD N/A 2/3/2018    Procedure: COLONOSCOPY;  Surgeon: Andreas Resendez III, MD;  Location: [facility];  Service: Gastroenterology    SHOULDER SURGERY Right      reconstruction for chronic anterior dislocation    TONSILLECTOMY      with adenoidectomy    TUBAL LIGATION         Family History   Problem Relation Age of Onset    Hypertension Mother     Alzheimer's disease Mother     Dementia Mother     COPD Father     Pulmonary fibrosis Father     Asthma Sister          Medications have been verified.        Objective   /62   Pulse 78   Temp 98.4 °F (36.9 °C)   Resp 18   LMP  (LMP Unknown)   SpO2 97%        Physical Exam     Physical Exam  Vitals and nursing note reviewed.   Constitutional:       General: She is not in acute distress.     Appearance: Normal appearance. She is not ill-appearing.   HENT:      Head: Normocephalic and atraumatic.      Right Ear: Tympanic membrane, ear canal and external ear normal.      Left Ear: Tympanic membrane, ear canal and external ear normal.      Nose: Congestion present. No rhinorrhea.      Mouth/Throat:      Lips: Pink.      Mouth: Mucous membranes are moist.      Pharynx: No oropharyngeal exudate or posterior oropharyngeal erythema.      Tonsils: No tonsillar exudate.   Eyes:      General: Vision grossly intact.      Extraocular Movements: Extraocular movements intact.      Pupils: Pupils are equal, round, and reactive to light.   Cardiovascular:      Rate and Rhythm: Normal rate and regular rhythm.      Heart sounds: Normal heart sounds. No murmur heard.  Pulmonary:      Effort: Pulmonary effort is normal. No respiratory distress.      Breath sounds: Normal breath sounds. No decreased air movement. No decreased breath sounds, wheezing, rhonchi or rales.   Musculoskeletal:         General: Normal range of motion.      Cervical back: Normal range of motion.   Lymphadenopathy:      Cervical: No cervical adenopathy.   Skin:     General: Skin is warm.      Findings: No rash.   Neurological:      Mental Status: She is alert and oriented to person, place, and time.      Motor: Motor function is intact.      Gait: Gait is intact.    Psychiatric:         Attention and Perception: Attention normal.         Mood and Affect: Mood normal.

## 2025-05-05 ENCOUNTER — OFFICE VISIT (OUTPATIENT)
Dept: URGENT CARE | Facility: CLINIC | Age: 70
End: 2025-05-05
Payer: MEDICARE

## 2025-05-05 VITALS
RESPIRATION RATE: 18 BRPM | OXYGEN SATURATION: 97 % | DIASTOLIC BLOOD PRESSURE: 73 MMHG | HEART RATE: 57 BPM | SYSTOLIC BLOOD PRESSURE: 166 MMHG | TEMPERATURE: 97.3 F

## 2025-05-05 DIAGNOSIS — J34.0 CELLULITIS OF NASAL TIP: Primary | ICD-10-CM

## 2025-05-05 PROCEDURE — G0463 HOSPITAL OUTPT CLINIC VISIT: HCPCS

## 2025-05-05 PROCEDURE — 99213 OFFICE O/P EST LOW 20 MIN: CPT

## 2025-05-05 RX ORDER — CEPHALEXIN 500 MG/1
500 CAPSULE ORAL EVERY 6 HOURS SCHEDULED
Qty: 28 CAPSULE | Refills: 0 | Status: SHIPPED | OUTPATIENT
Start: 2025-05-05 | End: 2025-05-12

## 2025-05-05 RX ORDER — MUPIROCIN 20 MG/G
OINTMENT TOPICAL 2 TIMES DAILY
Qty: 30 G | Refills: 1 | Status: SHIPPED | OUTPATIENT
Start: 2025-05-05 | End: 2025-05-12

## 2025-05-05 NOTE — PROGRESS NOTES
[facility]        NAME: Yady Mogne is a 69 y.o. female  : 1955    MRN: [id number]  DATE: May 12, 2025  TIME: 8:44 AM    Assessment and Plan   Cellulitis of nasal tip [J34.0]  1. Cellulitis of nasal tip  cephalexin (KEFLEX) 500 mg capsule    mupirocin (BACTROBAN) 2 % ointment          Patient Instructions   Take antibiotics as prescribed.   Take entire course of antibiotics.      Eat yogurt with live and active cultures and/or take a probiotic at least 3 hours before or after antibiotic dose.   Monitor stool for diarrhea and/or blood. If this occurs, contact primary care doctor ASAP.      Good hand hygiene  Avoid scratching area  Keep area clean and dry    Apply Mupirocin to a Q-tip, apply twice a day to inside of nostril as discussed.      Monitor site for signs of worsening infection including but not limited to increased redness, swelling, discharge, drainage, streaking, or if you develop any fever, body ache, chills, new joint pain or swelling, headache or dizziness, please proceed to the ER.       Follow up with PCP in 3-5 days.   Proceed to ER if symptoms worsen.    If tests are performed, our office will contact you with results only if changes need to made to the care plan discussed with you at the visit. You can review your full results on [facility].    Chief Complaint     Chief Complaint   Patient presents with   • sore nose     Started last week. Swelling worse last night. Feels pressure as well to eye      History of Present Illness       Pt is a 68 y/o F who presents to the clinic for a CC of a sore nose that started last week.     Pt states since she had COVID approx 1 month ago, with a constant runny nose, her nose felt like it got sore. Pt was placed on abx for a dental infection and the soreness to her nose improved. Soreness returned last week.     Pt reports worsening swelling to nose last night. Pressure like sensation to eye. Denies any fever, body aches or chills.      Review of Systems   Review of Systems   Constitutional:  Negative for chills, diaphoresis and fever.   HENT:  Positive for facial swelling.         Sore nose, R side, tip of inside of nostril    Respiratory: Negative.  Negative for cough, shortness of breath and wheezing.    Cardiovascular: Negative.  Negative for chest pain and palpitations.   Skin:  Positive for color change. Negative for rash and wound.     Current Medications       Current Outpatient Medications:   •  cephalexin (KEFLEX) 500 mg capsule, Take 1 capsule (500 mg total) by mouth every 6 (six) hours for 7 days, Disp: 28 capsule, Rfl: 0  •  mupirocin (BACTROBAN) 2 % ointment, Apply topically 2 (two) times a day for 7 days, Disp: 30 g, Rfl: 1  •  fluticasone (FLONASE) 50 mcg/act nasal spray, 1 spray into each nostril daily, Disp: 1 Bottle, Rfl: 3    Current Allergies     Allergies as of 05/05/2025 - Reviewed 05/05/2025   Allergen Reaction Noted   • Bee venom Swelling 07/23/2024   • Tramadol GI Intolerance 02/02/2018            The following portions of the patient's history were reviewed and updated as appropriate: allergies, current medications, past family history, past medical history, past social history, past surgical history and problem list.     Past Medical History:   Diagnosis Date   • Arthritis    • Colon polyp 3/22/2024   • Degenerative joint disease of right hip    • High cholesterol     treated with diet changes and fish oil   • Hip pain        Past Surgical History:   Procedure Laterality Date   • COLONOSCOPY     • HYSTERECTOMY     • JOINT REPLACEMENT  04/16/2016   • [place] ARTHRP ACETBLR/PROX FEM PROSTC AGRFT/ALGRFT Right 4/18/2016    Procedure: TOTAL HIP ARTHROPLASTY ;  Surgeon: Valdo Aquino MD;  [facility];  Service: Orthopedics   • TX COLONOSCOPY FLX DX W/COLLJ SPEC WHEN PFRMD N/A 2/3/2018    Procedure: COLONOSCOPY;  Surgeon: Andreas Resendez III, MD;  Location: [facility];  Service: Gastroenterology   • SHOULDER SURGERY  Right     reconstruction for chronic anterior dislocation   • TONSILLECTOMY      with adenoidectomy   • TUBAL LIGATION         Family History   Problem Relation Age of Onset   • Hypertension Mother    • Alzheimer's disease Mother    • Dementia Mother    • COPD Father    • Pulmonary fibrosis Father    • Asthma Sister          Medications have been verified.        Objective   /73   Pulse 57   Temp (!) 97.3 °F (36.3 °C)   Resp 18   LMP  (LMP Unknown)   SpO2 97%        Physical Exam     Physical Exam  Vitals and nursing note reviewed.   Constitutional:       General: She is not in acute distress.     Appearance: Normal appearance. She is not ill-appearing, toxic-appearing or diaphoretic.   HENT:      Head: Normocephalic.      Nose: Nasal tenderness and mucosal edema present. No septal deviation, signs of injury, congestion or rhinorrhea.      Right Nostril: No occlusion.      Right Turbinates: Enlarged and swollen. Not pale.      Right Sinus: Maxillary sinus tenderness present.     Cardiovascular:      Rate and Rhythm: Regular rhythm. Bradycardia present.      Pulses: Normal pulses.      Heart sounds: Normal heart sounds. No murmur heard.  Pulmonary:      Effort: Pulmonary effort is normal. No respiratory distress.      Breath sounds: Normal breath sounds. No stridor. No wheezing, rhonchi or rales.   Chest:      Chest wall: No tenderness.   Musculoskeletal:         General: Normal range of motion.   Skin:     General: Skin is warm.      Findings: Erythema present. No bruising or lesion.   Neurological:      Mental Status: She is alert.

## 2025-05-05 NOTE — PATIENT INSTRUCTIONS
Take antibiotics as prescribed.   Take entire course of antibiotics.      Eat yogurt with live and active cultures and/or take a probiotic at least 3 hours before or after antibiotic dose.   Monitor stool for diarrhea and/or blood. If this occurs, contact primary care doctor ASAP.      Good hand hygiene  Avoid scratching area  Keep area clean and dry    Apply Mupirocin to a Q-tip, apply twice a day to inside of nostril as discussed.      Monitor site for signs of worsening infection including but not limited to increased redness, swelling, discharge, drainage, streaking, or if you develop any fever, body ache, chills, new joint pain or swelling, headache or dizziness, please proceed to the ER.       Follow up with PCP in 3-5 days.   Proceed to ER if symptoms worsen.

## 2025-07-07 ENCOUNTER — APPOINTMENT (OUTPATIENT)
Dept: LAB | Facility: CLINIC | Age: 70
End: 2025-07-07
Payer: MEDICARE

## 2025-07-07 DIAGNOSIS — E78.5 HYPERLIPIDEMIA, UNSPECIFIED HYPERLIPIDEMIA TYPE: ICD-10-CM

## 2025-07-07 LAB
ALBUMIN SERPL BCG-MCNC: 4.1 G/DL (ref 3.5–5)
ALP SERPL-CCNC: 74 U/L (ref 34–104)
ALT SERPL W P-5'-P-CCNC: 16 U/L (ref 7–52)
ANION GAP SERPL CALCULATED.3IONS-SCNC: 9 MMOL/L (ref 4–13)
AST SERPL W P-5'-P-CCNC: 16 U/L (ref 13–39)
BILIRUB SERPL-MCNC: 0.38 MG/DL (ref 0.2–1)
BUN SERPL-MCNC: 20 MG/DL (ref 5–25)
CALCIUM SERPL-MCNC: 8.9 MG/DL (ref 8.4–10.2)
CHLORIDE SERPL-SCNC: 107 MMOL/L (ref 96–108)
CHOLEST SERPL-MCNC: 219 MG/DL (ref ?–200)
CO2 SERPL-SCNC: 25 MMOL/L (ref 21–32)
CREAT SERPL-MCNC: 0.76 MG/DL (ref 0.6–1.3)
GFR SERPL CREATININE-BSD FRML MDRD: 79 ML/MIN/1.73SQ M
GLUCOSE P FAST SERPL-MCNC: 93 MG/DL (ref 65–99)
HDLC SERPL-MCNC: 42 MG/DL
LDLC SERPL CALC-MCNC: 142 MG/DL (ref 0–100)
POTASSIUM SERPL-SCNC: 4 MMOL/L (ref 3.5–5.3)
PROT SERPL-MCNC: 6.7 G/DL (ref 6.4–8.4)
SODIUM SERPL-SCNC: 141 MMOL/L (ref 135–147)
TRIGL SERPL-MCNC: 175 MG/DL (ref ?–150)

## 2025-07-07 PROCEDURE — 80061 LIPID PANEL: CPT

## 2025-07-07 PROCEDURE — 80053 COMPREHEN METABOLIC PANEL: CPT

## 2025-07-07 PROCEDURE — 36415 COLL VENOUS BLD VENIPUNCTURE: CPT

## 2025-07-09 ENCOUNTER — OFFICE VISIT (OUTPATIENT)
Dept: FAMILY MEDICINE CLINIC | Facility: MEDICAL CENTER | Age: 70
End: 2025-07-09
Payer: MEDICARE

## 2025-07-09 VITALS
BODY MASS INDEX: 27.9 KG/M2 | DIASTOLIC BLOOD PRESSURE: 78 MMHG | HEIGHT: 66 IN | HEART RATE: 83 BPM | RESPIRATION RATE: 20 BRPM | OXYGEN SATURATION: 98 % | WEIGHT: 173.6 LBS | SYSTOLIC BLOOD PRESSURE: 128 MMHG | TEMPERATURE: 97.4 F

## 2025-07-09 DIAGNOSIS — Z12.11 COLON CANCER SCREENING: ICD-10-CM

## 2025-07-09 DIAGNOSIS — E78.5 HYPERLIPIDEMIA, UNSPECIFIED HYPERLIPIDEMIA TYPE: ICD-10-CM

## 2025-07-09 DIAGNOSIS — Z00.00 MEDICARE ANNUAL WELLNESS VISIT, SUBSEQUENT: Primary | ICD-10-CM

## 2025-07-09 PROCEDURE — G2211 COMPLEX E/M VISIT ADD ON: HCPCS | Performed by: STUDENT IN AN ORGANIZED HEALTH CARE EDUCATION/TRAINING PROGRAM

## 2025-07-09 PROCEDURE — G0439 PPPS, SUBSEQ VISIT: HCPCS | Performed by: STUDENT IN AN ORGANIZED HEALTH CARE EDUCATION/TRAINING PROGRAM

## 2025-07-09 PROCEDURE — 99213 OFFICE O/P EST LOW 20 MIN: CPT | Performed by: STUDENT IN AN ORGANIZED HEALTH CARE EDUCATION/TRAINING PROGRAM

## 2025-07-09 RX ORDER — ROSUVASTATIN CALCIUM 5 MG/1
5 TABLET, COATED ORAL DAILY
Qty: 90 TABLET | Refills: 1 | Status: SHIPPED | OUTPATIENT
Start: 2025-07-09

## 2025-07-09 NOTE — ASSESSMENT & PLAN NOTE
7/7/2025 CMP and lipid panel reviewed  Discussed results and ASCVD risk score and that she is a candidate for statin therapy to prevent adverse cardiovascular event  Discussed statins and potential side effects including LFT elevation and myalgias  Patient agreeable to starting statin, start Crestor 5 mg p.o. nightly  Repeat CMP and lipid panel in 6 months and follow-up in office at that time  Orders:    Lipid Panel with Direct LDL reflex; Future    Comprehensive metabolic panel; Future    rosuvastatin (CRESTOR) 5 mg tablet; Take 1 tablet (5 mg total) by mouth daily  The 10-year ASCVD risk score (Keon BRIDGES, et al., 2019) is: 10.3%    Values used to calculate the score:      Age: 69 years      Clincally relevant sex: Female      Is Non- : No      Diabetic: No      Tobacco smoker: No      Systolic Blood Pressure: 128 mmHg      Is BP treated: No      HDL Cholesterol: 42 mg/dL      Total Cholesterol: 219 mg/dL

## 2025-07-09 NOTE — PROGRESS NOTES
Name: Yady Monge      : 1955      MRN: [id number]  Encounter Provider: Amanda Westbrook DO  Encounter Date: 2025   Encounter department: [facility]  :  Assessment & Plan  Medicare annual wellness visit, subsequent         Colon cancer screening    Orders:    Ambulatory Referral to Gastroenterology; Future        Hyperlipidemia, unspecified hyperlipidemia type  2025 CMP and lipid panel reviewed  Discussed results and ASCVD risk score and that she is a candidate for statin therapy to prevent adverse cardiovascular event  Discussed statins and potential side effects including LFT elevation and myalgias  Patient agreeable to starting statin, start Crestor 5 mg p.o. nightly  Repeat CMP and lipid panel in 6 months and follow-up in office at that time  Orders:    Lipid Panel with Direct LDL reflex; Future    Comprehensive metabolic panel; Future    rosuvastatin (CRESTOR) 5 mg tablet; Take 1 tablet (5 mg total) by mouth daily  The 10-year ASCVD risk score (Keon BRIDGES, et al., 2019) is: 10.3%    Values used to calculate the score:      Age: 69 years      Clincally relevant sex: Female      Is Non- : No      Diabetic: No      Tobacco smoker: No      Systolic Blood Pressure: 128 mmHg      Is BP treated: No      HDL Cholesterol: 42 mg/dL      Total Cholesterol: 219 mg/dL      Depression Screening and Follow-up Plan: Patient was screened for depression during today's encounter. They screened negative with a PHQ-2 score of 0.        Preventive health issues were discussed with patient, and age appropriate screening tests were ordered as noted in patient's After Visit Summary. Personalized health advice and appropriate referrals for health education or preventive services given if needed, as noted in patient's After Visit Summary.    History of Present Illness     HPI   Patient Care Team:  Amanda Westbrook DO as PCP - General (Family Medicine)  Andreas Resendez III, MD  as Endoscopist    Review of Systems    As noted in HPI   Medical History Reviewed by provider this encounter:  Tobacco  Allergies  Meds  Problems  Med Hx  Surg Hx  Fam Hx       Annual Wellness Visit Questionnaire   Yady is here for her Subsequent Wellness visit.     Health Risk Assessment:   Patient rates overall health as very good. Patient feels that their physical health rating is same. Patient is satisfied with their life. Eyesight was rated as same. Hearing was rated as same. Patient feels that their emotional and mental health rating is slightly better. Patients states they are never, rarely angry. Patient states they are never, rarely unusually tired/fatigued. Pain experienced in the last 7 days has been none. Patient states that she has experienced no weight loss or gain in last 6 months.     Depression Screening:   PHQ-2 Score: 0      Fall Risk Screening:   In the past year, patient has experienced: history of falling in past year    Number of falls: 1  Injured during fall?: No    Feels unsteady when standing or walking?: No    Worried about falling?: No      Urinary Incontinence Screening:   Patient has leaked urine accidently in the last six months.     Home Safety:  Patient does not have trouble with stairs inside or outside of their home. Patient has working smoke alarms and has working carbon monoxide detector. Home safety hazards include: none.     Nutrition:   Current diet is Regular and Limited junk food.     Medications:   Patient is not currently taking any over-the-counter supplements. Patient is able to manage medications.     Activities of Daily Living (ADLs)/Instrumental Activities of Daily Living (IADLs):   Walk and transfer into and out of bed and chair?: Yes  Dress and groom yourself?: Yes    Bathe or shower yourself?: Yes    Feed yourself? Yes  Do your laundry/housekeeping?: Yes  Manage your money, pay your bills and track your expenses?: Yes  Make your own meals?: Yes    Do your  own shopping?: Yes    Previous Hospitalizations:   Any hospitalizations or ED visits within the last 12 months?: No      Advance Care Planning:   Living will: No    Durable POA for healthcare: No    Advanced directive: No    Advanced directive counseling given: Yes    ACP document given: Yes    Patient declined ACP directive: No      Cognitive Screening:   Provider or family/friend/caregiver concerned regarding cognition?: No    Preventive Screenings      Cardiovascular Screening:    General: History Lipid Disorder and Screening Current      Diabetes Screening:     General: Screening Current      Colorectal Cancer Screening:     General: Screening Current      Breast Cancer Screening:     General: Screening Current      Cervical Cancer Screening:    General: Screening Not Indicated      Osteoporosis Screening:    General: Screening Current      Abdominal Aortic Aneurysm (AAA) Screening:        General: Screening Not Indicated      Lung Cancer Screening:     General: Screening Not Indicated      Hepatitis C Screening:    General: Screening Current      Preventive Screening Comments: Colonoscopy due 2025  Patient had bone density scan completed 2025    Immunizations:  - Immunizations due: Zoster (Shingrix) and RSV  - Risks/benefits immunizations discussed      Screening, Brief Intervention, and Referral to Treatment (SBIRT)     Screening  Typical number of drinks in a day: 0  Typical number of drinks in a week: 0  Interpretation: Low risk drinking behavior.    AUDIT-C Screenin) How often did you have a drink containing alcohol in the past year? monthly or less  2) How many drinks did you have on a typical day when you were drinking in the past year? 1 to 2  3) How often did you have 6 or more drinks on one occasion in the past year? never    AUDIT-C Score: 1  Interpretation: Score 0-2 (female): Negative screen for alcohol misuse    Single Item Drug Screening:  How often have you used an illegal  "drug (including marijuana) or a prescription medication for non-medical reasons in the past year? never    Single Item Drug Screen Score: 0  Interpretation: Negative screen for possible drug use disorder    Other Counseling Topics:   Car/seat belt/driving safety, sunscreen and regular weightbearing exercise. Sees Dermatology for skin checks    Social Drivers of Health     Financial Resource Strain: Low Risk  (2/15/2023)    Overall Financial Resource Strain (CARDIA)     Difficulty of Paying Living Expenses: Not very hard   Food Insecurity: No Food Insecurity (7/7/2025)    Nursing - Inadequate Food Risk Classification     Worried About Running Out of Food in the Last Year: Never true     Ran Out of Food in the Last Year: Never true   Transportation Needs: No Transportation Needs (7/7/2025)    PRAPARE - Transportation     Lack of Transportation (Medical): No     Lack of Transportation (Non-Medical): No   Housing Stability: Low Risk  (7/7/2025)    Housing Stability Vital Sign     Unable to Pay for Housing in the Last Year: No     Number of Times Moved in the Last Year: 1     Homeless in the Last Year: No   Utilities: Not At Risk (7/7/2025)    [facility] Utilities     Threatened with loss of utilities: No     No results found.    Objective   /78 (Patient Position: Sitting, Cuff Size: Standard)   Pulse 83   Temp (!) 97.4 °F (36.3 °C) (Temporal)   Resp 20   Ht 5' 6\" (1.676 m)   Wt 78.7 kg (173 lb 9.6 oz)   LMP  (LMP Unknown)   SpO2 98%   BMI 28.02 kg/m²     Physical Exam  Vitals reviewed.   Constitutional:       General: She is not in acute distress.     Appearance: Normal appearance.   HENT:      Head: Normocephalic and atraumatic.     Eyes:      Conjunctiva/sclera: Conjunctivae normal.     Pulmonary:      Effort: Pulmonary effort is normal.     Neurological:      Mental Status: She is alert and oriented to person, place, and time.     Psychiatric:         Mood and Affect: Mood normal.         Behavior: Behavior " normal.         Thought Content: Thought content normal.

## 2025-07-10 ENCOUNTER — TELEPHONE (OUTPATIENT)
Dept: ADMINISTRATIVE | Facility: OTHER | Age: 70
End: 2025-07-10

## 2025-07-10 NOTE — TELEPHONE ENCOUNTER
----- Message from Sherron POTTER sent at 7/9/2025  4:06 PM EDT -----  Regarding: Care Gap Request  07/09/25 4:06 PM    Hello, our patient above has had DEXA Scan completed/performed. Please assist in updating the patient chart by pulling the document from the Imaging/Procedure Tab. The date of service is 2/15/25.     Thank you,  Sherron Rosales MA  [facility]

## 2025-07-10 NOTE — TELEPHONE ENCOUNTER
Upon review of the In Basket request we were able to locate, review, and update the patient chart as requested for DEXA Scan.    Any additional questions or concerns should be emailed to the Practice Liaisons via the appropriate education email address, please do not reply via In Basket.    Thank you  Natalie Garay MA   [facility]